# Patient Record
Sex: MALE | Race: BLACK OR AFRICAN AMERICAN
[De-identification: names, ages, dates, MRNs, and addresses within clinical notes are randomized per-mention and may not be internally consistent; named-entity substitution may affect disease eponyms.]

---

## 2018-06-15 ENCOUNTER — HOSPITAL ENCOUNTER (INPATIENT)
Dept: HOSPITAL 74 - YASAS | Age: 58
LOS: 3 days | Discharge: ANOTHER HOSPITAL | DRG: 897 | End: 2018-06-18
Attending: INTERNAL MEDICINE | Admitting: INTERNAL MEDICINE
Payer: MEDICARE

## 2018-06-15 VITALS — BODY MASS INDEX: 27.3 KG/M2

## 2018-06-15 DIAGNOSIS — I10: ICD-10-CM

## 2018-06-15 DIAGNOSIS — Z91.14: ICD-10-CM

## 2018-06-15 DIAGNOSIS — E78.00: ICD-10-CM

## 2018-06-15 DIAGNOSIS — K21.9: ICD-10-CM

## 2018-06-15 DIAGNOSIS — F14.20: ICD-10-CM

## 2018-06-15 DIAGNOSIS — F31.30: ICD-10-CM

## 2018-06-15 DIAGNOSIS — F17.210: ICD-10-CM

## 2018-06-15 DIAGNOSIS — F43.10: ICD-10-CM

## 2018-06-15 DIAGNOSIS — F10.230: Primary | ICD-10-CM

## 2018-06-15 DIAGNOSIS — G47.00: ICD-10-CM

## 2018-06-15 DIAGNOSIS — I25.10: ICD-10-CM

## 2018-06-15 DIAGNOSIS — Z91.5: ICD-10-CM

## 2018-06-15 LAB
ALBUMIN SERPL-MCNC: 3.6 G/DL (ref 3.4–5)
ALP SERPL-CCNC: 43 U/L (ref 45–117)
ALT SERPL-CCNC: 27 U/L (ref 12–78)
ANION GAP SERPL CALC-SCNC: 7 MMOL/L (ref 8–16)
APPEARANCE UR: CLEAR
AST SERPL-CCNC: 14 U/L (ref 15–37)
BILIRUB SERPL-MCNC: 0.4 MG/DL (ref 0.2–1)
BILIRUB UR STRIP.AUTO-MCNC: NEGATIVE MG/DL
BUN SERPL-MCNC: 18 MG/DL (ref 7–18)
CALCIUM SERPL-MCNC: 9.1 MG/DL (ref 8.5–10.1)
CHLORIDE SERPL-SCNC: 105 MMOL/L (ref 98–107)
CO2 SERPL-SCNC: 28 MMOL/L (ref 21–32)
COLOR UR: (no result)
CREAT SERPL-MCNC: 1.4 MG/DL (ref 0.7–1.3)
DEPRECATED RDW RBC AUTO: 14.2 % (ref 11.9–15.9)
GLUCOSE SERPL-MCNC: 102 MG/DL (ref 74–106)
HCT VFR BLD CALC: 42.4 % (ref 35.4–49)
HGB BLD-MCNC: 13.9 GM/DL (ref 11.7–16.9)
KETONES UR QL STRIP: NEGATIVE
LEUKOCYTE ESTERASE UR QL STRIP.AUTO: NEGATIVE
MCH RBC QN AUTO: 28.6 PG (ref 25.7–33.7)
MCHC RBC AUTO-ENTMCNC: 32.9 G/DL (ref 32–35.9)
MCV RBC: 86.8 FL (ref 80–96)
NITRITE UR QL STRIP: NEGATIVE
PH UR: 6 [PH] (ref 5–8)
PLATELET # BLD AUTO: 267 K/MM3 (ref 134–434)
PMV BLD: 8.6 FL (ref 7.5–11.1)
POTASSIUM SERPLBLD-SCNC: 4.3 MMOL/L (ref 3.5–5.1)
PROT SERPL-MCNC: 7 G/DL (ref 6.4–8.2)
PROT UR QL STRIP: NEGATIVE
PROT UR QL STRIP: NEGATIVE
RBC # BLD AUTO: 4.88 M/MM3 (ref 4–5.6)
SODIUM SERPL-SCNC: 140 MMOL/L (ref 136–145)
SP GR UR: 1.02 (ref 1–1.03)
UROBILINOGEN UR STRIP-MCNC: NEGATIVE MG/DL (ref 0.2–1)
WBC # BLD AUTO: 6.7 K/MM3 (ref 4–10)

## 2018-06-15 PROCEDURE — HZ2ZZZZ DETOXIFICATION SERVICES FOR SUBSTANCE ABUSE TREATMENT: ICD-10-PCS | Performed by: INTERNAL MEDICINE

## 2018-06-15 RX ADMIN — ZOLPIDEM TARTRATE SCH MG: 10 TABLET, FILM COATED ORAL at 22:08

## 2018-06-15 RX ADMIN — Medication SCH MG: at 22:08

## 2018-06-15 RX ADMIN — ATORVASTATIN CALCIUM SCH MG: 20 TABLET, FILM COATED ORAL at 22:08

## 2018-06-15 RX ADMIN — QUETIAPINE FUMARATE SCH MG: 100 TABLET ORAL at 22:08
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
## 2018-06-16 RX ADMIN — Medication SCH TAB: at 10:45

## 2018-06-16 RX ADMIN — QUETIAPINE FUMARATE SCH MG: 100 TABLET ORAL at 22:18

## 2018-06-16 RX ADMIN — ZOLPIDEM TARTRATE SCH MG: 10 TABLET, FILM COATED ORAL at 22:18

## 2018-06-16 RX ADMIN — ASPIRIN SCH MG: 81 TABLET, COATED ORAL at 10:45

## 2018-06-16 RX ADMIN — Medication SCH MG: at 22:18

## 2018-06-16 RX ADMIN — ATORVASTATIN CALCIUM SCH MG: 20 TABLET, FILM COATED ORAL at 22:18

## 2018-06-16 RX ADMIN — PANTOPRAZOLE SODIUM SCH MG: 40 TABLET, DELAYED RELEASE ORAL at 10:46

## 2018-06-16 RX ADMIN — NICOTINE SCH MG: 21 PATCH TRANSDERMAL at 10:46

## 2018-06-16 RX ADMIN — LISINOPRIL SCH MG: 20 TABLET ORAL at 10:45

## 2018-06-16 NOTE — EKG
Test Reason : 

Blood Pressure : ***/*** mmHG

Vent. Rate : 064 BPM     Atrial Rate : 064 BPM

   P-R Int : 130 ms          QRS Dur : 084 ms

    QT Int : 398 ms       P-R-T Axes : 066 013 027 degrees

   QTc Int : 410 ms

 

NORMAL SINUS RHYTHM

POSSIBLE LEFT ATRIAL ENLARGEMENT

LEFT VENTRICULAR HYPERTROPHY

ABNORMAL ECG

NO PREVIOUS ECGS AVAILABLE

Confirmed by [place], ZA ([id number]) on 6/16/2018 9:23:11 AM

 

Referred By:             Confirmed By:ZA RAMIRES MD

## 2018-06-16 NOTE — PN
S CIWA





- CIWA Score


Nausea/Vomiting: 3


Muscle Tremors: 3


Anxiety: 4-Mod. Anxious/Guarded


Agitation: 2


Paroxysmal Sweats: 3


Orientation: 0-Oriented


Tacttile Disturbances: 1-Very Mild Itch/Numbness


Auditory Disturbances: 0-None


Visual Disturbances: 0-None


Headache: 2-Mild


CIWA-Ar Total Score: 18





[facility] Progress Note (SOAP)


Subjective: 





Sweating, Body Aches, Interrupted Sleep, H/A, Diarrhea, Nausea.


Objective: 


PATIENT A & O X 3, OBSERVED AMBULATING ON UNIT. NO ACUTE DISTRESS.





06/16/18 16:21


 Vital Signs











Temperature  96.9 F L  06/16/18 13:33


 


Pulse Rate  73   06/16/18 13:33


 


Respiratory Rate  18   06/16/18 13:33


 


Blood Pressure  128/80   06/16/18 13:33


 


O2 Sat by Pulse Oximetry (%)      








 Laboratory Tests











  06/15/18 06/15/18 06/15/18





  11:00 11:00 11:00


 


WBC    6.7


 


RBC    4.88


 


Hgb    13.9


 


Hct    42.4


 


MCV    86.8


 


MCH    28.6


 


MCHC    32.9


 


RDW    14.2


 


Plt Count    267


 


MPV    8.6


 


Sodium   


 


Potassium   


 


Chloride   


 


Carbon Dioxide   


 


Anion Gap   


 


BUN   


 


Creatinine   


 


Creat Clearance w eGFR   


 


Random Glucose   


 


Calcium   


 


Total Bilirubin   


 


AST   


 


ALT   


 


Alkaline Phosphatase   


 


Total Protein   


 


Albumin   


 


Urine Color   


 


Urine Appearance   


 


Urine pH   


 


Ur Specific Gravity   


 


Urine Protein   


 


Urine Glucose (UA)   


 


Urine Ketones   


 


Urine Blood   


 


Urine Nitrite   


 


Urine Bilirubin   


 


Urine Urobilinogen   


 


Ur Leukocyte Esterase   


 


RPR Titer   


 


Hep C Ab Diagnostic   <0.1 


 


Liver Fibrosis Interp    


 


HIV 1&2 Antibody Screen  Negative  


 


HIV P24 Antigen  Negative  














  06/15/18 06/15/18 06/15/18





  11:00 11:00 12:10


 


WBC   


 


RBC   


 


Hgb   


 


Hct   


 


MCV   


 


MCH   


 


MCHC   


 


RDW   


 


Plt Count   


 


MPV   


 


Sodium  140  


 


Potassium  4.3  


 


Chloride  105  


 


Carbon Dioxide  28  


 


Anion Gap  7 L  


 


BUN  18  D  


 


Creatinine  1.4 H D  


 


Creat Clearance w eGFR  52.42  


 


Random Glucose  102  D  


 


Calcium  9.1  


 


Total Bilirubin  0.4  


 


AST  14 L D  


 


ALT  27  D  


 


Alkaline Phosphatase  43 L D  


 


Total Protein  7.0  


 


Albumin  3.6  


 


Urine Color    Ltyellow


 


Urine Appearance    Clear


 


Urine pH    6.0


 


Ur Specific Gravity    1.018


 


Urine Protein    Negative


 


Urine Glucose (UA)    Negative


 


Urine Ketones    Negative


 


Urine Blood    Negative


 


Urine Nitrite    Negative


 


Urine Bilirubin    Negative


 


Urine Urobilinogen    Negative


 


Ur Leukocyte Esterase    Negative


 


RPR Titer   Nonreactive 


 


Hep C Ab Diagnostic   


 


Liver Fibrosis Interp   


 


HIV 1&2 Antibody Screen   


 


HIV P24 Antigen   








LABS NOTED.


Assessment: 





06/16/18 16:21


WITHDRAWAL SYMPTOMS.


Plan: 





CONTINUE DETOX.


INCREASE DAILY PO FLUID INTAKE.


PRN IMMODIUM FOR DIARRHEA.


D/C IBUPROFEN AND MAGNESIUM-CONTAINING MEDS. FOR ABNORMAL ADMISSION RENAL LAB 

VALUES.

## 2018-06-17 RX ADMIN — Medication SCH MG: at 22:11

## 2018-06-17 RX ADMIN — ZOLPIDEM TARTRATE SCH MG: 10 TABLET, FILM COATED ORAL at 22:12

## 2018-06-17 RX ADMIN — ATORVASTATIN CALCIUM SCH MG: 20 TABLET, FILM COATED ORAL at 22:12

## 2018-06-17 RX ADMIN — NICOTINE SCH MG: 21 PATCH TRANSDERMAL at 10:26

## 2018-06-17 RX ADMIN — ALUMINUM HYDROXIDE, MAGNESIUM HYDROXIDE, AND SIMETHICONE PRN ML: 200; 200; 20 SUSPENSION ORAL at 20:24

## 2018-06-17 RX ADMIN — ASPIRIN SCH MG: 81 TABLET, COATED ORAL at 10:26

## 2018-06-17 RX ADMIN — LISINOPRIL SCH MG: 20 TABLET ORAL at 10:26

## 2018-06-17 RX ADMIN — Medication SCH TAB: at 10:25

## 2018-06-17 RX ADMIN — PANTOPRAZOLE SODIUM SCH MG: 40 TABLET, DELAYED RELEASE ORAL at 10:26

## 2018-06-17 RX ADMIN — QUETIAPINE FUMARATE SCH MG: 100 TABLET ORAL at 22:12

## 2018-06-17 NOTE — PN
[facility] Progress Note


Note: 





Pt's Bp slightly elevated, pt with no complaints.


No s/s noted


ongoing monitoring of Bp

## 2018-06-17 NOTE — PN
[facility] CIWA





- CIWA Score


Nausea/Vomitin-Mild Nausea/No Vomiting


Muscle Tremors: 2


Anxiety: 3


Agitation: 3


Paroxysmal Sweats: 2


Orientation: 0-Oriented


Tacttile Disturbances: 0-None


Auditory Disturbances: 0-None


Visual Disturbances: 1-Very Mild Sensitivity


Headache: 0-None Present


CIWA-Ar Total Score: 12





[facility] Progress Note (SOAP)


Subjective: 





body aches, interrupted sleep, anxious, diarrhea 


Objective: 





18 11:44


 Vital Signs











Temperature  96.6 F L  18 09:27


 


Pulse Rate  16 L  18 09:27


 


Respiratory Rate  115 H  18 09:27


 


Blood Pressure  130/87   18 09:27


 


O2 Sat by Pulse Oximetry (%)      








 Laboratory Last Values











WBC  6.7 K/mm3 (4.0-10.0)   06/15/18  11:00    


 


RBC  4.88 M/mm3 (4.00-5.60)   06/15/18  11:00    


 


Hgb  13.9 GM/dL (11.7-16.9)   06/15/18  11:00    


 


Hct  42.4 % (35.4-49)   06/15/18  11:00    


 


MCV  86.8 fl (80-96)   06/15/18  11:00    


 


MCH  28.6 pg (25.7-33.7)   06/15/18  11:00    


 


MCHC  32.9 g/dl (32.0-35.9)   06/15/18  11:00    


 


RDW  14.2 % (11.9-15.9)   06/15/18  11:00    


 


Plt Count  267 K/MM3 (134-434)   06/15/18  11:00    


 


MPV  8.6 fl (7.5-11.1)   06/15/18  11:00    


 


Sodium  140 mmol/L (136-145)   06/15/18  11:00    


 


Potassium  4.3 mmol/L (3.5-5.1)   06/15/18  11:00    


 


Chloride  105 mmol/L ()   06/15/18  11:00    


 


Carbon Dioxide  28 mmol/L (21-32)   06/15/18  11:00    


 


Anion Gap  7  (8-16)  L  06/15/18  11:00    


 


BUN  18 mg/dL (7-18)  D 06/15/18  11:00    


 


Creatinine  1.4 mg/dL (0.7-1.3)  H D 06/15/18  11:00    


 


Creat Clearance w eGFR  52.42  (>60)   06/15/18  11:00    


 


Random Glucose  102 mg/dL ()  D 06/15/18  11:00    


 


Calcium  9.1 mg/dL (8.5-10.1)   06/15/18  11:00    


 


Total Bilirubin  0.4 mg/dL (0.2-1.0)   06/15/18  11:00    


 


AST  14 U/L (15-37)  L D 06/15/18  11:00    


 


ALT  27 U/L (12-78)  D 06/15/18  11:00    


 


Alkaline Phosphatase  43 U/L ()  L D 06/15/18  11:00    


 


Total Protein  7.0 g/dl (6.4-8.2)   06/15/18  11:00    


 


Albumin  3.6 g/dl (3.4-5.0)   06/15/18  11:00    


 


Urine Color  Ltyellow   06/15/18  12:10    


 


Urine Appearance  Clear   06/15/18  12:10    


 


Urine pH  6.0  (5.0-8.0)   06/15/18  12:10    


 


Ur Specific Gravity  1.018  (1.001-1.035)   06/15/18  12:10    


 


Urine Protein  Negative  (NEGATIVE)   06/15/18  12:10    


 


Urine Glucose (UA)  Negative  (NEGATIVE)   06/15/18  12:10    


 


Urine Ketones  Negative  (NEGATIVE)   06/15/18  12:10    


 


Urine Blood  Negative  (NEGATIVE)   06/15/18  12:10    


 


Urine Nitrite  Negative  (NEGATIVE)   06/15/18  12:10    


 


Urine Bilirubin  Negative  (<2.0 mg/dL)   06/15/18  12:10    


 


Urine Urobilinogen  Negative mg/dL (0.2-1.0)   06/15/18  12:10    


 


Ur Leukocyte Esterase  Negative  (NEGATIVE)   06/15/18  12:10    


 


RPR Titer  Nonreactive  (NONREACTIVE)   06/15/18  11:00    


 


Hep C Ab Diagnostic  <0.1 s/co ratio (0.0-0.9)   06/15/18  11:00    


 


Liver Fibrosis Interp    (.)   06/15/18  11:00    


 


HIV 1&2 Antibody Screen  Negative   06/15/18  11:00    


 


HIV P24 Antigen  Negative   06/15/18  11:00    











Assessment: 





18 11:44


AOx3


no adventurous breath sounds or respiratory distress 


full ROM 


+anxious 








withdrawal sx 


Plan: 





increase fluids 


immodium PRN for diarrhea 


continue detox

## 2018-06-18 ENCOUNTER — HOSPITAL ENCOUNTER (OUTPATIENT)
Dept: HOSPITAL 74 - JER | Age: 58
Setting detail: OBSERVATION
LOS: 2 days | Discharge: HOME | DRG: 313 | End: 2018-06-20
Attending: INTERNAL MEDICINE | Admitting: INTERNAL MEDICINE
Payer: MEDICARE

## 2018-06-18 VITALS — HEART RATE: 90 BPM | DIASTOLIC BLOOD PRESSURE: 86 MMHG | TEMPERATURE: 96.6 F | SYSTOLIC BLOOD PRESSURE: 144 MMHG

## 2018-06-18 VITALS — BODY MASS INDEX: 28.4 KG/M2

## 2018-06-18 DIAGNOSIS — E78.00: ICD-10-CM

## 2018-06-18 DIAGNOSIS — Z79.82: ICD-10-CM

## 2018-06-18 DIAGNOSIS — E11.9: ICD-10-CM

## 2018-06-18 DIAGNOSIS — K21.9: ICD-10-CM

## 2018-06-18 DIAGNOSIS — F10.230: ICD-10-CM

## 2018-06-18 DIAGNOSIS — F43.10: ICD-10-CM

## 2018-06-18 DIAGNOSIS — R07.89: Primary | ICD-10-CM

## 2018-06-18 DIAGNOSIS — I10: ICD-10-CM

## 2018-06-18 DIAGNOSIS — J45.909: ICD-10-CM

## 2018-06-18 DIAGNOSIS — F17.210: ICD-10-CM

## 2018-06-18 DIAGNOSIS — E78.5: ICD-10-CM

## 2018-06-18 DIAGNOSIS — F31.9: ICD-10-CM

## 2018-06-18 DIAGNOSIS — I25.10: ICD-10-CM

## 2018-06-18 DIAGNOSIS — F14.20: ICD-10-CM

## 2018-06-18 LAB
ALBUMIN SERPL-MCNC: 3.2 G/DL (ref 3.4–5)
ALP SERPL-CCNC: 95 U/L (ref 45–117)
ALT SERPL-CCNC: 39 U/L (ref 12–78)
ANION GAP SERPL CALC-SCNC: 4 MMOL/L (ref 8–16)
AST SERPL-CCNC: 19 U/L (ref 15–37)
BASOPHILS # BLD: 1.1 % (ref 0–2)
BILIRUB SERPL-MCNC: 0.2 MG/DL (ref 0.2–1)
BUN SERPL-MCNC: 15 MG/DL (ref 7–18)
CALCIUM SERPL-MCNC: 8.9 MG/DL (ref 8.5–10.1)
CHLORIDE SERPL-SCNC: 107 MMOL/L (ref 98–107)
CO2 SERPL-SCNC: 30 MMOL/L (ref 21–32)
CREAT SERPL-MCNC: 0.9 MG/DL (ref 0.7–1.3)
DEPRECATED RDW RBC AUTO: 14.3 % (ref 11.9–15.9)
EOSINOPHIL # BLD: 3.8 % (ref 0–4.5)
GLUCOSE SERPL-MCNC: 108 MG/DL (ref 74–106)
HCT VFR BLD CALC: 39.6 % (ref 35.4–49)
HGB BLD-MCNC: 12.7 GM/DL (ref 11.7–16.9)
LYMPHOCYTES # BLD: 33.4 % (ref 8–40)
MCH RBC QN AUTO: 27.8 PG (ref 25.7–33.7)
MCHC RBC AUTO-ENTMCNC: 32.2 G/DL (ref 32–35.9)
MCV RBC: 86.5 FL (ref 80–96)
MONOCYTES # BLD AUTO: 9.9 % (ref 3.8–10.2)
NEUTROPHILS # BLD: 51.8 % (ref 42.8–82.8)
PLATELET # BLD AUTO: 246 K/MM3 (ref 134–434)
PMV BLD: 8.7 FL (ref 7.5–11.1)
POTASSIUM SERPLBLD-SCNC: 4.4 MMOL/L (ref 3.5–5.1)
PROT SERPL-MCNC: 6.5 G/DL (ref 6.4–8.2)
RBC # BLD AUTO: 4.57 M/MM3 (ref 4–5.6)
SODIUM SERPL-SCNC: 141 MMOL/L (ref 136–145)
WBC # BLD AUTO: 6.2 K/MM3 (ref 4–10)

## 2018-06-18 PROCEDURE — G0378 HOSPITAL OBSERVATION PER HR: HCPCS

## 2018-06-18 PROCEDURE — 3E0337Z INTRODUCTION OF ELECTROLYTIC AND WATER BALANCE SUBSTANCE INTO PERIPHERAL VEIN, PERCUTANEOUS APPROACH: ICD-10-PCS | Performed by: INTERNAL MEDICINE

## 2018-06-18 RX ADMIN — Medication SCH TAB: at 10:10

## 2018-06-18 RX ADMIN — Medication SCH MG: at 22:53

## 2018-06-18 RX ADMIN — ASPIRIN SCH MG: 81 TABLET, COATED ORAL at 10:10

## 2018-06-18 RX ADMIN — ATORVASTATIN CALCIUM SCH MG: 20 TABLET, FILM COATED ORAL at 22:53

## 2018-06-18 RX ADMIN — NICOTINE SCH MG: 21 PATCH TRANSDERMAL at 10:10

## 2018-06-18 RX ADMIN — ALUMINUM HYDROXIDE, MAGNESIUM HYDROXIDE, AND SIMETHICONE PRN ML: 200; 200; 20 SUSPENSION ORAL at 13:34

## 2018-06-18 RX ADMIN — ALUMINUM HYDROXIDE, MAGNESIUM HYDROXIDE, AND SIMETHICONE PRN ML: 200; 200; 20 SUSPENSION ORAL at 16:16

## 2018-06-18 RX ADMIN — GABAPENTIN SCH MG: 300 CAPSULE ORAL at 22:53

## 2018-06-18 RX ADMIN — LISINOPRIL SCH MG: 20 TABLET ORAL at 10:10

## 2018-06-18 RX ADMIN — PANTOPRAZOLE SODIUM SCH MG: 40 TABLET, DELAYED RELEASE ORAL at 10:10

## 2018-06-18 NOTE — PN
[facility] Progress Note


Note: 





Called to see patient for c/o chest pain. Patient states has had chest pain for 

last 3 days and thought it was heart burn. No relief w/ treatment for GERD.  

Pain is mid-sternal and associated w/ SOB. Hx. asthma. On final day for alcohol 

detox. 


A: Murmur noted on PE. HR regular rhythm. Repeat EKG shows T-wave abnormalities 

noted on EKG. No edema. Lungs CTA. Distended/protuberant abdomen, non-tender w/ 

BS wnl. 


 Vital Signs (72 hours)











  06/15/18 06/15/18 06/16/18





  18:15 22:18 00:23


 


Temperature 97.1 F L 96.4 F L 


 


Pulse Rate 62 65 


 


Respiratory 18 18 18





Rate   


 


Blood Pressure 135/77 126/76 














  06/16/18 06/16/18 06/16/18





  06:30 09:16 13:33


 


Temperature 97.0 F L 97.0 F L 96.9 F L


 


Pulse Rate 71 66 73


 


Respiratory 18 18 18





Rate   


 


Blood Pressure 122/76 125/72 128/80














  06/16/18 06/16/18 06/17/18





  17:36 21:24 00:30


 


Temperature 97.7 F 97.0 F L 


 


Pulse Rate 64 73 


 


Respiratory 18 18 18





Rate   


 


Blood Pressure 113/63 127/77 














  06/17/18 06/17/18 06/17/18





  03:30 06:26 09:27


 


Temperature  96.6 F L 96.6 F L


 


Pulse Rate  90 16 L


 


Respiratory 18 18 115 H





Rate   


 


Blood Pressure  130/87 130/87














  06/17/18 06/17/18 06/17/18





  14:00 17:13 21:11


 


Temperature 96.7 F L 98.6 F 98.3 F


 


Pulse Rate 78 86 90


 


Respiratory 18 18 18





Rate   


 


Blood Pressure 109/62 159/95 124/76














  06/18/18 06/18/18 06/18/18





  00:30 03:30 03:33


 


Temperature   


 


Pulse Rate   


 


Respiratory 18 18 18





Rate   


 


Blood Pressure   














  06/18/18 06/18/18 06/18/18





  06:07 09:05 13:42


 


Temperature 96.8 F L 97.1 F L 97.2 F L


 


Pulse Rate 87 83 96 H


 


Respiratory 18 20 20





Rate   


 


Blood Pressure 130/73 120/75 143/92








 P: Transport to [facility]. Report given to Mervin Crockett MD.

## 2018-06-18 NOTE — PDOC
Attending Attestation





- HPI


HPI: 








The patient is a 58 year old male past medical history of NIDDM, HLD, HTN, 

Asthma, GERD, ACS, PTSD, and Bipolar disorder presents to the emergency 

department from [facility] complaining of chest pain. The patient reports 

intermittent chest pain for the past 2 days, associated with episodes of 

diarrhea, vomiting and chills. The patient reports the pain was worse today 

leading to the ED visit, the patient states around 7-8 AM in the morning the 

patients been experiencing on and off chest pain. The patient states 

currently the pain is 4/10 in severity. The patient reports 2 prior heart 

attacks, one cocaine induced, the patient was supposed to cardiac cath but 

states he didnt follow up. The patient was admitted to [facility] on Friday 06/

15/2018) for alcohol detox. The patient states he was taken of metformin 18 

months ago, with unknown reasons. 





Denies fever, chills, cough or headache. Denies sob or orthopnea. Denies 

constipation. Denies dysuria, hematuria, frequency or urgency to urinate. 

Denies vertigo or dizziness. 





Allergies: NKDA, McKean. 


Social history: The patient reports he smokes about 5 cigarettes daily. Reports 

the daily use of alcohol since teenager, states he drinks about 6 24 OZ and 1-

2 pint a day. The patient reports the use of heroine, denies recent use. 


Surgical history: appendectomy (.87)


PCP: None reported. 








- Physicial Exam


PE: 








06/18/18 18:57


GENERAL:


Well developed, well nourished. Awake and alert. No acute distress.


HEENT:


Normocephalic, atraumatic. PERRLA, EOMI. No conjunctival pallor. Sclera are non-

icteric. Moist mucous membranes. Oropharynx is clear.


NECK: 


Supple. Full ROM. No JVD. Carotid pulses 2+ and symmetric, without bruits. No 

thyromegaly. No lymphadenopathy.


CARDIOVASCULAR: (+) holosystolic murmur


Regular rate and rhythm. No rubs, or gallops. Distal pulses are 2+ and 

symmetric. 


PULMONARY: 


No evidence of respiratory distress. Lungs clear to auscultation bilaterally. 

No wheezing, rales or rhonchi.


ABDOMINAL:


Soft. Non-tender. Non-distended. No rebound or guarding. No organomegaly. 

Normoactive bowel sounds. 


MUSCULOSKELETAL 


Normal range of motion at all joints. No bony deformities or tenderness. No CVA 

tenderness.


EXTREMITIES: 


No cyanosis. No clubbing. No edema. No calf tenderness.


SKIN: 


Warm and dry. Normal capillary refill. No rashes. No jaundice. 


NEUROLOGICAL: 


Alert, awake, appropriate. Cranial nerves 2-12 intact. No deficits to light 

touch and temperature in face, upper extremities and lower extremities. No 

motor deficits in the in face, upper extremities and lower extremities. 

Normoreflexic in the upper and lower extremities. Normal speech. Toes are down-

going bilaterally. Gait is normal without ataxia.


PSYCHIATRIC: 


Cooperative. Good eye contact. Appropriate mood and affect.











- Medical Decision Making








06/18/18 18:59





Documentation prepared by Tianna Pierre, acting as medical scribe for Elba Crain MD. 





<Tianna Pierre - Last Filed: 06/18/18 18:55>





- Resident


Resident Name: Russ Rodriguezorn





- ED Attending Attestation


I have performed the following: I have examined & evaluated the patient, The 

case was reviewed & discussed with the resident, I agree w/resident's findings 

& plan, Exceptions are as noted





- HPI


HPI: 





06/18/18 17:51


58-year-old male brought in by ambulance from [facility] for complaints of chest 

pain that is intermittent for 2 days





- Physicial Exam


PE: 





06/18/18 17:52


58-year-old male is conversant and alert.  He is chest pain is 4 out of 10


Head normocephalic/atraumatic.


Neck no JVD.


CVS regular rate and rhythm S1, S2.  Positive holosystolic murmur.


Abdomen is soft


Extremities no edema, no cellulitis.


Skin is warm and dry.


Neuro alert and oriented 3, moving extremities purposefully. 


 Psych patient is appropriate





- Medical Decision Making





06/18/18 17:53


Impression angina


Plan EKG, troponin, CBC, chemistry, INR, trop x 3, admit tele OBS


06/18/18 18:31





06/18/18 19:36, Dr Tejada accepted the case


first trop is negative





<Elba Crain - Last Filed: 06/18/18 19:37>

## 2018-06-18 NOTE — HP
Admitting History and Physical





- Primary Care Physician


PCP: Niki Tejada





- Admission


Chief Complaint: chest pain


History of Present Illness: 





Mr. Eldridge is a 57 yo male w/ pmh of Asthma, NIDDM, HLD, HTN, GERD, ACS, 

appendectomy, PTSD, and Bipolar Disorder who presents for evaluation of 2-3 day 

history of intermittent chest pain. He reports he has had 2 MI's in the past (

one cocaine induced, one he was told to get a cardiac cath after and never 

followed up) and decided to come in from [facility] for evaluation after his 

chest pain started. He smokes 5 cigarettes per day. Sees NP Jade Kaur at 

[facility], currently on Seroquel 250 mg po hs, Risperdol 2 mg am and 3 

mg hs, Trazodone 100 mg po hs, Gabapentin 300 mg po bid.











- Past Medical History


Cardiovascular: Yes: CAD, HTN, Hyperlipdemia


Pulmonary: Yes: Asthma


Endocrine: Yes: Diabetes Mellitus





- Smoking History


Smoking history: Current every day smoker


Have you smoked in the past 12 months: Yes


Aproximately how many cigarettes per day: 5





- Alcohol/Substance Use


Hx Alcohol Use: Yes





Home Medications





- Allergies


Allergies/Adverse Reactions: 


 Allergies











Allergy/AdvReac Type Severity Reaction Status Date / Time


 


No Known Drug Allergies Allergy   Verified 06/18/18 17:43


 


strawberry [Strawberry] Allergy  Itching Verified 06/18/18 17:43














- Home Medications


Home Medications: 


Ambulatory Orders





Simvastatin [Zocor -] 40 mg PO HS #30 tablet 04/04/14 


Quetiapine Fumarate [Seroquel -] 50 mg PO DAILY 02/12/15 


Risperidone [Risperdal -] 6 mg PO BID 02/12/15 


Albuterol Sulfate Inhaler - [Ventolin HFA Inhaler -] 2 inh IH Q4H PRN #1 

canister 02/16/15 


Thiamine HCl [Vitamin B1 -] 100 mg PO HS #30 tablet 02/16/15 


Aspirin [Ecotrin] 81 mg PO DAILY 04/08/16 


Lisinopril 20 mg PO DAILY 04/08/16 


Naproxen 500 mg PO DAILY 04/08/16 


Omeprazole [Prilosec] 40 mg PO DAILY 04/08/16 


Zolpidem Tartrate 10 mg PO HS 04/08/16 


Fluticasone/Salmeterol [Advair 500-50 Diskus] 1 puff IH BID 09/14/16 


Paroxetine HCl [Paxil -] 40 mg PO DAILY 09/14/16 


Folic Acid - 1 mg PO DAILY 06/15/18 


Gabapentin [Neurontin -] 300 mg PO TID 06/15/18 


Quetiapine Fumarate [Seroquel -] 200 mg PO HS 06/15/18 


Trazodone HCl 100 mg PO HS 06/15/18 











Family Disease History





- Family Disease History


Family Disease History: Heart Disease: Grandparent (HTN), Father (alcohol, 

deceaded heart attack), Mother (HTN,ALCOHOLIC), Sister (hypertension/

hypercholesterol), Other: Father, Mother, Sister





Physical Examination


Vital Signs: 


 Vital Signs











Temperature  98.4 F   06/18/18 17:20


 


Pulse Rate  79   06/18/18 17:20


 


Respiratory Rate  18   06/18/18 17:20


 


Blood Pressure  122/77   06/18/18 17:20


 


O2 Sat by Pulse Oximetry (%)  98   06/18/18 18:07











Constitutional: Yes: No Distress


HENT: Yes: Atraumatic


Neck: Yes: Supple


Cardiovascular: Yes: Regular Rate and Rhythm


Respiratory: Yes: CTA Bilaterally


Gastrointestinal: Yes: Normal Bowel Sounds


Extremities: Yes: WNL


Labs: 


 CBC, BMP





 06/18/18 17:49 





 06/18/18 17:49 











Problem List





- Problems


(1) Alcohol dependence with uncomplicated withdrawal


Code(s): F10.230 - ALCOHOL DEPENDENCE WITH WITHDRAWAL, UNCOMPLICATED   





(2) Chest pain


Assessment/Plan: 


tele monitoring


fu cardiac profile


echo


cardiology consult


oxygen inhalation


Code(s): R07.9 - CHEST PAIN, UNSPECIFIED   


Qualifiers: 


   Chest pain type: unspecified   Qualified Code(s): R07.9 - Chest pain, 

unspecified   





(3) Cocaine dependence


Code(s): F14.20 - COCAINE DEPENDENCE, UNCOMPLICATED   


Qualifiers: 


 





(4) HTN (hypertension)


Assessment/Plan: 


on meds


monitor


Code(s): I10 - ESSENTIAL (PRIMARY) HYPERTENSION   





(5) Hypercholesterolemia


Assessment/Plan: 


on meds


stable


Code(s): E78.0 - PURE HYPERCHOLESTEROLEMIA * DO NOT USE *   





Assessment/Plan





 Laboratory Tests











  06/18/18 06/18/18





  17:49 17:49


 


WBC  6.2 


 


RBC  4.57 


 


Hgb  12.7 


 


Hct  39.6 


 


MCV  86.5 


 


MCH  27.8 


 


MCHC  32.2 


 


RDW  14.3 


 


Plt Count  246 


 


MPV  8.7 


 


Absolute Neuts (auto)  3.2 


 


Neutrophils %  51.8 


 


Lymphocytes %  33.4 


 


Monocytes %  9.9 


 


Eosinophils %  3.8 


 


Basophils %  1.1 


 


Nucleated RBC %  0 


 


Sodium   141


 


Potassium   4.4


 


Chloride   107


 


Carbon Dioxide   30


 


Anion Gap   4 L


 


BUN   15


 


Creatinine   0.9  D


 


Creat Clearance w eGFR   > 60


 


Random Glucose   108 H


 


Calcium   8.9


 


Total Bilirubin   0.2  D


 


AST   19  D


 


ALT   39  D


 


Alkaline Phosphatase   95  D


 


Creatine Kinase   198


 


Troponin I   < 0.02


 


Total Protein   6.5


 


Albumin   3.2 L








Active Medications











Generic Name Dose Route Start Last Admin





  Trade Name Freq  PRN Reason Stop Dose Admin


 


Albuterol Sulfate  2 puff  06/18/18 20:53  





  Ventolin Hfa Inhaler -  IH   





  Q4H PRN   





  SHORT OF BREATH/WHEEZING   





     





     





     


 


Aspirin  81 mg  06/19/18 10:00  06/19/18 09:52





  Ecotrin -  PO   81 mg





  DAILY JESSICA   Administration





     





     





     





     


 


Atorvastatin Calcium  20 mg  06/18/18 22:00  06/18/18 22:53





  Lipitor -  PO   20 mg





  HS JESSICA   Administration





     





     





     





     


 


Folic Acid  1 mg  06/19/18 10:00  06/19/18 09:51





  Folic Acid -  PO   1 mg





  DAILY JESSICA   Administration





     





     





     





     


 


Gabapentin  300 mg  06/18/18 22:00  06/19/18 15:14





  Neurontin -  PO   300 mg





  TID JESSICA   Administration





     





     





     





     


 


Lisinopril  20 mg  06/19/18 10:00  06/19/18 09:52





  Prinivil  PO   20 mg





  DAILY JESSICA   Administration





     





     





     





     


 


Paroxetine HCl  40 mg  06/19/18 10:00  06/19/18 09:51





  Paxil -  PO   40 mg





  DAILY JESSICA   Administration





     





     





     





     


 


Quetiapine Fumarate  50 mg  06/19/18 10:00  06/19/18 09:52





  Seroquel -  PO   50 mg





  DAILY JESSICA   Administration





     





     





     





     


 


Thiamine HCl  100 mg  06/18/18 22:00  06/18/18 22:53





  Vitamin B1 -  PO   100 mg





  HS JESSICA   Administration

## 2018-06-18 NOTE — PN
[facility] Progress Note (SOAP)


Subjective: 





Sweats


Shakes


sleep disturbance


Heartburn


Objective: 





06/18/18 12:30


Lying in bed, no acute distress


A & O x 3


 Vital Signs











Temperature  97.1 F L  06/18/18 09:05


 


Pulse Rate  83   06/18/18 09:05


 


Respiratory Rate  20   06/18/18 09:05


 


Blood Pressure  120/75   06/18/18 09:05


 


O2 Sat by Pulse Oximetry (%)      











Assessment: 





06/18/18 12:30


withdrawal sx


Acid reflux


Plan: 





Continue detox


Continue increased hydratio


Protonix for acid reflux


increased hydration


Avoid caffeinated beverages

## 2018-06-19 VITALS — TEMPERATURE: 98 F

## 2018-06-19 RX ADMIN — GABAPENTIN SCH MG: 300 CAPSULE ORAL at 06:34

## 2018-06-19 RX ADMIN — GABAPENTIN SCH MG: 300 CAPSULE ORAL at 15:14

## 2018-06-19 NOTE — PN
Progress Note, Physician





- Current Medication List


Current Medications: 


Active Medications





Albuterol Sulfate (Ventolin Hfa Inhaler -)  2 puff IH Q4H PRN


   PRN Reason: SHORT OF BREATH/WHEEZING


Aspirin (Ecotrin -)  81 mg PO DAILY [facility]


   Last Admin: 06/19/18 09:52 Dose:  81 mg


Atorvastatin Calcium (Lipitor -)  20 mg PO [facility]


   Last Admin: 06/18/18 22:53 Dose:  20 mg


Folic Acid (Folic Acid -)  1 mg PO DAILY [facility]


   Last Admin: 06/19/18 09:51 Dose:  1 mg


Gabapentin (Neurontin -)  300 mg PO TID [facility]


   Last Admin: 06/19/18 15:14 Dose:  300 mg


Lisinopril (Prinivil)  20 mg PO DAILY [facility]


   Last Admin: 06/19/18 09:52 Dose:  20 mg


Paroxetine HCl (Paxil -)  40 mg PO DAILY [facility]


   Last Admin: 06/19/18 09:51 Dose:  40 mg


Quetiapine Fumarate (Seroquel -)  50 mg PO DAILY [facility]


   Last Admin: 06/19/18 09:52 Dose:  50 mg


Thiamine HCl (Vitamin B1 -)  100 mg PO [facility]


   Last Admin: 06/18/18 22:53 Dose:  100 mg











- Objective


Vital Signs: 


 Vital Signs











Temperature  98 F   06/19/18 15:20


 


Pulse Rate  68   06/19/18 15:20


 


Respiratory Rate  20   06/19/18 15:20


 


Blood Pressure  90/67   06/19/18 15:20


 


O2 Sat by Pulse Oximetry (%)  100   06/19/18 10:00











Constitutional: Yes: No Distress


HENT: Yes: Atraumatic


Neck: Yes: Supple


Cardiovascular: Yes: Regular Rate and Rhythm


Respiratory: Yes: CTA Bilaterally


Gastrointestinal: Yes: Normal Bowel Sounds


Extremities: Yes: WNL


Neurological: Yes: Alert, Oriented


Labs: 


 CBC, BMP





 06/18/18 17:49 





 06/18/18 17:49 











Problem List





- Problems


(1) Alcohol dependence with uncomplicated withdrawal


Assessment/Plan: 


done with [facility] , will be going to other rehab on his own


pt  has info


Code(s): F10.230 - ALCOHOL DEPENDENCE WITH WITHDRAWAL, UNCOMPLICATED   





(2) Chest pain


Assessment/Plan: 


tele monitoring


fu cardiac profile...negative


echo...done





Code(s): R07.9 - CHEST PAIN, UNSPECIFIED   


Qualifiers: 


   Chest pain type: unspecified   Qualified Code(s): R07.9 - Chest pain, 

unspecified   





(3) Cocaine dependence


Code(s): F14.20 - COCAINE DEPENDENCE, UNCOMPLICATED   


Qualifiers: 


 





(4) HTN (hypertension)


Code(s): I10 - ESSENTIAL (PRIMARY) HYPERTENSION   


Qualifiers: 


   Hypertension type: essential hypertension   Qualified Code(s): I10 - 

Essential (primary) hypertension   





(5) Hypercholesterolemia


Code(s): E78.0 - PURE HYPERCHOLESTEROLEMIA * DO NOT USE *

## 2018-06-19 NOTE — PN
Progress Note, Physician





- Current Medication List


Current Medications: 


Active Medications





Albuterol Sulfate (Ventolin Hfa Inhaler -)  2 puff IH Q4H PRN


   PRN Reason: SHORT OF BREATH/WHEEZING


Aspirin (Ecotrin -)  81 mg PO DAILY [facility]


   Last Admin: 06/19/18 09:52 Dose:  81 mg


Atorvastatin Calcium (Lipitor -)  20 mg PO [facility]


   Last Admin: 06/18/18 22:53 Dose:  20 mg


Folic Acid (Folic Acid -)  1 mg PO DAILY [facility]


   Last Admin: 06/19/18 09:51 Dose:  1 mg


Gabapentin (Neurontin -)  300 mg PO TID [facility]


   Last Admin: 06/19/18 15:14 Dose:  300 mg


Lisinopril (Prinivil)  20 mg PO DAILY [facility]


   Last Admin: 06/19/18 09:52 Dose:  20 mg


Paroxetine HCl (Paxil -)  40 mg PO DAILY [facility]


   Last Admin: 06/19/18 09:51 Dose:  40 mg


Quetiapine Fumarate (Seroquel -)  50 mg PO DAILY [facility]


   Last Admin: 06/19/18 09:52 Dose:  50 mg


Thiamine HCl (Vitamin B1 -)  100 mg PO [facility]


   Last Admin: 06/18/18 22:53 Dose:  100 mg











- Objective


Vital Signs: 


 Vital Signs











Temperature  98 F   06/19/18 15:20


 


Pulse Rate  68   06/19/18 15:20


 


Respiratory Rate  20   06/19/18 15:20


 


Blood Pressure  90/67   06/19/18 15:20


 


O2 Sat by Pulse Oximetry (%)  100   06/19/18 10:00











Constitutional: Yes: No Distress


HENT: Yes: Atraumatic


Neck: Yes: Supple


Cardiovascular: Yes: Regular Rate and Rhythm


Respiratory: Yes: CTA Bilaterally


Gastrointestinal: Yes: Normal Bowel Sounds


Extremities: Yes: WNL


Edema: No


Peripheral Pulses WNL: Yes


Neurological: Yes: Alert, Oriented


Labs: 


 CBC, BMP





 06/18/18 17:49 





 06/18/18 17:49 











Problem List





- Problems


(1) Alcohol dependence with uncomplicated withdrawal


Assessment/Plan: 


dc to rehab


Code(s): F10.230 - ALCOHOL DEPENDENCE WITH WITHDRAWAL, UNCOMPLICATED   





(2) Chest pain


Assessment/Plan: 


tele monitoring


fu cardiac profile...negative


echo...done





Code(s): R07.9 - CHEST PAIN, UNSPECIFIED   


Qualifiers: 


   Chest pain type: unspecified   Qualified Code(s): R07.9 - Chest pain, 

unspecified   





(3) Cocaine dependence


Code(s): F14.20 - COCAINE DEPENDENCE, UNCOMPLICATED   


Qualifiers: 


 





(4) HTN (hypertension)


Code(s): I10 - ESSENTIAL (PRIMARY) HYPERTENSION   





(5) Hypercholesterolemia


Code(s): E78.0 - PURE HYPERCHOLESTEROLEMIA * DO NOT USE *

## 2018-06-19 NOTE — CON.CARD
Consult


Consult Specialty:: Cardiology


Referred by:: Dr. Tejada


Reason for Consultation:: Cardiac evaluation





- History of Present Illness


Chief Complaint: Chest pain


History of Present Illness: 





Patient is a 58 year old  male with underlying history of HTN, 

hypercholesterolemia and previous history of DM on Metformin, but now stopped 

who presents with chest discomfort. He also has history of substance abuse 

including ETOH and Cocaine use. He last used cocaine on Friday and his chest 

pain has been present for about 3 days. He was at the detox facility in [facility] where he was noticed to be having chest discomfort, therefore; was 

admitted to the ED for further evaluations. Troponin has been negative so far 

and he denies chest pain at this moment. He denies shortness of breath or 

palpitations. He denies paroxysmal nocturnal dyspnea or orthopnea. He denies 

fever or chills. He denies nausea, vomiting, diarrhea or abdominal pain. He 

denies headache or lightheadedness.





- History Source


History Provided By: Patient, Medical Record


Limitations to Obtaining History: Clinical Condition





- Past Medical History


Cardio/Vascular: Yes: HTN, Hyperlipdemia


Pulmonary: Yes: Asthma


Endocrine: Yes: Diabetes Mellitus





- Alcohol/Substance Use


Hx Alcohol Use: Yes


History of Substance Use: reports: Cocaine





- Smoking History


Smoking history: Current every day smoker


Have you smoked in the past 12 months: Yes


Aproximately how many cigarettes per day: 5





Home Medications





- Allergies


Allergies/Adverse Reactions: 


 Allergies











Allergy/AdvReac Type Severity Reaction Status Date / Time


 


No Known Drug Allergies Allergy   Verified 06/18/18 17:43


 


strawberry [Strawberry] Allergy  Itching Verified 06/18/18 17:43














- Home Medications


Home Medications: 


Ambulatory Orders





Simvastatin [Zocor -] 40 mg PO HS #30 tablet 04/04/14 


Quetiapine Fumarate [Seroquel -] 50 mg PO DAILY 02/12/15 


Risperidone [Risperdal -] 6 mg PO BID 02/12/15 


Albuterol Sulfate Inhaler - [Ventolin HFA Inhaler -] 2 inh IH Q4H PRN #1 

canister 02/16/15 


Thiamine HCl [Vitamin B1 -] 100 mg PO HS #30 tablet 02/16/15 


Aspirin [Ecotrin] 81 mg PO DAILY 04/08/16 


Lisinopril 20 mg PO DAILY 04/08/16 


Naproxen 500 mg PO DAILY 04/08/16 


Omeprazole [Prilosec] 40 mg PO DAILY 04/08/16 


Zolpidem Tartrate 10 mg PO HS 04/08/16 


Fluticasone/Salmeterol [Advair 500-50 Diskus] 1 puff IH BID 09/14/16 


Paroxetine HCl [Paxil -] 40 mg PO DAILY 09/14/16 


Folic Acid - 1 mg PO DAILY 06/15/18 


Gabapentin [Neurontin -] 300 mg PO TID 06/15/18 


Quetiapine Fumarate [Seroquel -] 200 mg PO HS 06/15/18 


Trazodone HCl 100 mg PO HS 06/15/18 











Family Disease History





- Family Disease History


Family Disease History: Heart Disease: Grandparent (HTN), Father (alcohol, 

deceaded heart attack), Mother (HTN,ALCOHOLIC), Sister (hypertension/

hypercholesterol), Other: Father, Mother, Sister





Review of Systems





- Review of Systems


Constitutional: denies: Chills, Fever


Cardiovascular: reports: Chest Pain.  denies: Palpitations, Shortness of Breath


Respiratory: denies: Cough, Hemoptysis, Orthopnea, PND, SOB, SOB on Exertion, 

Wheezing


Gastrointestinal: denies: Abdominal Pain, Constipation, Diarrhea, Melena, Nausea

, Rectal Bleeding, Vomiting


Genitourinary: denies: Dysuria, Hematuria


Musculoskeletal: denies: Back Pain, Joint Pain


Neurological: denies: Confusion, Dizziness, Headache, Seizure, Syncope, Weakness


Vital Signs: 


 Vital Signs











Temperature  98 F   06/19/18 09:50


 


Pulse Rate  64   06/19/18 09:50


 


Respiratory Rate  20   06/19/18 09:50


 


Blood Pressure  106/70   06/19/18 09:50


 


O2 Sat by Pulse Oximetry (%)  100   06/19/18 06:48











Eyes: Yes: PERRL


HENT: Yes: Atraumatic


Neck: Yes: Supple


Respiratory: Yes: Regular, CTA Bilaterally


Gastrointestinal: Yes: Normal Bowel Sounds, Soft.  No: Tenderness


Cardiovascular: Yes: Regular Rate and Rhythm


JVD: No


Carotid Bruit: No


PMI: Non-Displaced


Heart Sounds: Yes: S1, S2.  No: Gallop


Murmur: Yes: Systolic Murmur, Grade 1


Edema: No





- Other Data


Labs, Other Data: 


 CBC, BMP





 06/18/18 17:49 





 06/18/18 17:49 





 Troponin, BNP











  06/18/18 06/19/18





  17:49 06:48


 


Troponin I  < 0.02  < 0.02








  











Normal sinus rhythm, no ST-T abnormality


Echo: Report Reviewed





Imaging





- Results


Chest X-ray: Report Reviewed (Unremarkable)


EKG: Report Reviewed





Problem List





- Problems


(1) Chest pain


Code(s): R07.9 - CHEST PAIN, UNSPECIFIED   


Qualifiers: 


   Chest pain type: unspecified   Qualified Code(s): R07.9 - Chest pain, 

unspecified   





(2) Alcohol dependence with uncomplicated withdrawal


Code(s): F10.230 - ALCOHOL DEPENDENCE WITH WITHDRAWAL, UNCOMPLICATED   





(3) Cocaine dependence


Code(s): F14.20 - COCAINE DEPENDENCE, UNCOMPLICATED   


Qualifiers: 


 





(4) Bipolar disorder


Code(s): F31.9 - BIPOLAR DISORDER, UNSPECIFIED   





(5) HTN (hypertension)


Code(s): I10 - ESSENTIAL (PRIMARY) HYPERTENSION   


Qualifiers: 


   Hypertension type: essential hypertension   Qualified Code(s): I10 - 

Essential (primary) hypertension   





(6) Hypercholesterolemia


Code(s): E78.0 - PURE HYPERCHOLESTEROLEMIA * DO NOT USE *   





(7) Asthma


Code(s): J45.909 - UNSPECIFIED ASTHMA, UNCOMPLICATED   


Qualifiers: 


   Asthma severity: unspecified severity   Asthma persistence: unspecified   

Asthma complication type: uncomplicated   Qualified Code(s): J45.909 - 

Unspecified asthma, uncomplicated   





Assessment/Plan





1. Chest pain syndrome with history of Cocaine use


2. HTN


3. Hypercholesterolemia


4. Diabetes mellitus


5. Substance abuse with ETOH and Cocaine


6. Bronchial asthma


7. Bipolar disorder





PLAN:


1. Serial cardiac enzymes negative 2 sets


2. Echocardiography reviewed with normal LV systolic function, MR, TR and AR


3. Further cardiac work up can be done as outpatient


4. Detox for ETOH and Cocaine


5. Avoid beta blocker for now


6. Continue Lisinopril and Simvastatin


7. ASA





May be discharged home cardiac standpoint.


Eros Bangura MD

## 2018-06-19 NOTE — EKG
Test Reason : 

Blood Pressure : ***/*** mmHG

Vent. Rate : 074 BPM     Atrial Rate : 074 BPM

   P-R Int : 120 ms          QRS Dur : 084 ms

    QT Int : 376 ms       P-R-T Axes : 066 021 010 degrees

   QTc Int : 417 ms

 

NORMAL SINUS RHYTHM

POSSIBLE LEFT ATRIAL ENLARGEMENT

LEFT VENTRICULAR HYPERTROPHY

NONSPECIFIC T WAVE ABNORMALITY

ABNORMAL ECG

Confirmed by MD Cristiane, Geovani ([id number]) on 6/19/2018 2:19:04 PM

 

Referred By:             Confirmed By:Geovani Sauer MD

## 2018-06-20 VITALS — DIASTOLIC BLOOD PRESSURE: 70 MMHG | SYSTOLIC BLOOD PRESSURE: 108 MMHG | HEART RATE: 72 BPM

## 2018-06-20 RX ADMIN — ATORVASTATIN CALCIUM SCH MG: 20 TABLET, FILM COATED ORAL at 00:31

## 2018-06-20 RX ADMIN — Medication SCH: at 00:31

## 2018-06-20 RX ADMIN — GABAPENTIN SCH MG: 300 CAPSULE ORAL at 00:31

## 2018-06-20 RX ADMIN — GABAPENTIN SCH MG: 300 CAPSULE ORAL at 06:23

## 2018-06-20 NOTE — DS
Physical Examination


Vital Signs: 


 Vital Signs











Temperature  98 F   06/19/18 15:20


 


Pulse Rate  72   06/20/18 06:25


 


Respiratory Rate  14   06/20/18 06:25


 


Blood Pressure  108/70   06/20/18 06:25


 


O2 Sat by Pulse Oximetry (%)  98   06/20/18 06:25











Constitutional: Yes: No Distress


HENT: Yes: Atraumatic


Cardiovascular: Yes: Regular Rate and Rhythm


Respiratory: Yes: CTA Bilaterally


Gastrointestinal: Yes: Normal Bowel Sounds


Extremities: Yes: WNL


Neurological: Yes: Alert, Oriented


Labs: 


 CBC, BMP





 06/18/18 17:49 





 06/18/18 17:49 











Discharge Summary


Reason For Visit: CHEST PAIN





- Instructions


Disposition: HOME





- Home Medications


Comprehensive Discharge Medication List: 


Ambulatory Orders





Simvastatin [Zocor -] 40 mg PO HS #30 tablet 04/04/14 


Quetiapine Fumarate [Seroquel -] 50 mg PO DAILY 02/12/15 


Risperidone [Risperdal -] 6 mg PO BID 02/12/15 


Albuterol Sulfate Inhaler - [Ventolin HFA Inhaler -] 2 inh IH Q4H PRN #1 

canister 02/16/15 


Thiamine HCl [Vitamin B1 -] 100 mg PO HS #30 tablet 02/16/15 


Aspirin [Ecotrin] 81 mg PO DAILY 04/08/16 


Lisinopril 20 mg PO DAILY 04/08/16 


Naproxen 500 mg PO DAILY 04/08/16 


Omeprazole [Prilosec] 40 mg PO DAILY 04/08/16 


Zolpidem Tartrate 10 mg PO HS 04/08/16 


Fluticasone/Salmeterol [Advair 500-50 Diskus] 1 puff IH BID 09/14/16 


Paroxetine HCl [Paxil -] 40 mg PO DAILY 09/14/16 


Folic Acid - 1 mg PO DAILY 06/15/18 


Gabapentin [Neurontin -] 300 mg PO TID 06/15/18 


Quetiapine Fumarate [Seroquel -] 200 mg PO HS 06/15/18 


Trazodone HCl 100 mg PO HS 06/15/18 





[place] home

## 2018-12-18 ENCOUNTER — HOSPITAL ENCOUNTER (INPATIENT)
Dept: HOSPITAL 74 - YASAS | Age: 58
LOS: 3 days | Discharge: HOME | DRG: 897 | End: 2018-12-21
Attending: INTERNAL MEDICINE | Admitting: INTERNAL MEDICINE
Payer: COMMERCIAL

## 2018-12-18 VITALS — BODY MASS INDEX: 34.4 KG/M2

## 2018-12-18 DIAGNOSIS — J06.9: ICD-10-CM

## 2018-12-18 DIAGNOSIS — F10.230: Primary | ICD-10-CM

## 2018-12-18 DIAGNOSIS — F17.210: ICD-10-CM

## 2018-12-18 DIAGNOSIS — E78.00: ICD-10-CM

## 2018-12-18 DIAGNOSIS — F43.10: ICD-10-CM

## 2018-12-18 DIAGNOSIS — Z91.5: ICD-10-CM

## 2018-12-18 DIAGNOSIS — I10: ICD-10-CM

## 2018-12-18 DIAGNOSIS — G47.00: ICD-10-CM

## 2018-12-18 DIAGNOSIS — J45.909: ICD-10-CM

## 2018-12-18 DIAGNOSIS — I25.10: ICD-10-CM

## 2018-12-18 DIAGNOSIS — F14.20: ICD-10-CM

## 2018-12-18 DIAGNOSIS — Z91.19: ICD-10-CM

## 2018-12-18 DIAGNOSIS — E11.9: ICD-10-CM

## 2018-12-18 DIAGNOSIS — K21.9: ICD-10-CM

## 2018-12-18 DIAGNOSIS — F25.9: ICD-10-CM

## 2018-12-18 DIAGNOSIS — R00.0: ICD-10-CM

## 2018-12-18 PROCEDURE — HZ2ZZZZ DETOXIFICATION SERVICES FOR SUBSTANCE ABUSE TREATMENT: ICD-10-PCS | Performed by: INTERNAL MEDICINE

## 2018-12-18 RX ADMIN — TRAZODONE HYDROCHLORIDE SCH MG: 100 TABLET ORAL at 22:31

## 2018-12-18 RX ADMIN — Medication SCH MG: at 22:31

## 2018-12-18 RX ADMIN — BUDESONIDE AND FORMOTEROL FUMARATE DIHYDRATE SCH PUFF: 160; 4.5 AEROSOL RESPIRATORY (INHALATION) at 22:31
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
## 2018-12-19 LAB
ALBUMIN SERPL-MCNC: 3.8 G/DL (ref 3.4–5)
ALP SERPL-CCNC: 50 U/L (ref 45–117)
ALT SERPL-CCNC: 46 U/L (ref 13–61)
ANION GAP SERPL CALC-SCNC: 8 MMOL/L (ref 8–16)
AST SERPL-CCNC: 50 U/L (ref 15–37)
BILIRUB SERPL-MCNC: 0.3 MG/DL (ref 0.2–1)
BUN SERPL-MCNC: 14 MG/DL (ref 7–18)
CALCIUM SERPL-MCNC: 9 MG/DL (ref 8.5–10.1)
CHLORIDE SERPL-SCNC: 102 MMOL/L (ref 98–107)
CO2 SERPL-SCNC: 28 MMOL/L (ref 21–32)
CREAT SERPL-MCNC: 1.3 MG/DL (ref 0.55–1.3)
DEPRECATED RDW RBC AUTO: 14.4 % (ref 11.9–15.9)
GLUCOSE SERPL-MCNC: 93 MG/DL (ref 74–106)
HCT VFR BLD CALC: 42.4 % (ref 35.4–49)
HGB BLD-MCNC: 13.6 GM/DL (ref 11.7–16.9)
MCH RBC QN AUTO: 27.8 PG (ref 25.7–33.7)
MCHC RBC AUTO-ENTMCNC: 32 G/DL (ref 32–35.9)
MCV RBC: 86.8 FL (ref 80–96)
PLATELET # BLD AUTO: 258 K/MM3 (ref 134–434)
PMV BLD: 9.2 FL (ref 7.5–11.1)
POTASSIUM SERPLBLD-SCNC: 4.1 MMOL/L (ref 3.5–5.1)
PROT SERPL-MCNC: 7.5 G/DL (ref 6.4–8.2)
RBC # BLD AUTO: 4.88 M/MM3 (ref 4–5.6)
SODIUM SERPL-SCNC: 138 MMOL/L (ref 136–145)
WBC # BLD AUTO: 7.8 K/MM3 (ref 4–10)

## 2018-12-19 RX ADMIN — RANITIDINE SCH MG: 150 TABLET ORAL at 10:13

## 2018-12-19 RX ADMIN — ASPIRIN SCH MG: 81 TABLET, COATED ORAL at 10:13

## 2018-12-19 RX ADMIN — Medication SCH MG: at 22:03

## 2018-12-19 RX ADMIN — LISINOPRIL SCH MG: 10 TABLET ORAL at 10:16

## 2018-12-19 RX ADMIN — BUDESONIDE AND FORMOTEROL FUMARATE DIHYDRATE SCH PUFF: 160; 4.5 AEROSOL RESPIRATORY (INHALATION) at 22:03

## 2018-12-19 RX ADMIN — BUDESONIDE AND FORMOTEROL FUMARATE DIHYDRATE SCH PUFF: 160; 4.5 AEROSOL RESPIRATORY (INHALATION) at 10:31

## 2018-12-19 RX ADMIN — TRAZODONE HYDROCHLORIDE SCH MG: 100 TABLET ORAL at 22:03

## 2018-12-19 RX ADMIN — GUAIFENESIN AND DEXTROMETHORPHAN PRN ML: 100; 10 SYRUP ORAL at 10:33

## 2018-12-19 RX ADMIN — Medication SCH TAB: at 10:13
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
## 2018-12-19 NOTE — PN
S CIWA





- CIWA Score


Nausea/Vomitin-No Nausea/No Vomiting


Muscle Tremors: None


Anxiety: 0-No Anxiety, at Ease


Agitation: 0-Normal Activity


Paroxysmal Sweats: No Perspiration


Orientation: 0-Oriented


Tacttile Disturbances: 0-None


Auditory Disturbances: 0-None


Visual Disturbances: 0-None


Headache: 0-None Present


CIWA-Ar Total Score: 0





BHS Progress Note (SOAP)


Subjective: 





PT states he is doing well with minimal withdrawal Sx on the detox protocol





 Vital Signs - 24 hr











  18





  15:58 17:22 21:16


 


Temperature 96.6 F L 96.6 F L 97.9 F


 


Pulse Rate 84 89 70


 


Respiratory 16 18 18





Rate   


 


Blood Pressure 142/78 127/82 138/75














  18





  00:30 03:30 06:06


 


Temperature   97.6 F


 


Pulse Rate   65


 


Respiratory 18 18 18





Rate   


 


Blood Pressure   132/76














  18





  09:57 13:36


 


Temperature 96.6 F L 98.3 F


 


Pulse Rate 75 86


 


Respiratory 18 18





Rate  


 


Blood Pressure 133/71 120/70








 Laboratory Tests











  18





  14:09 05:45 05:45


 


WBC   7.8 


 


RBC   4.88 


 


Hgb   13.6 


 


Hct   42.4 


 


MCV   86.8 


 


MCH   27.8 


 


MCHC   32.0 


 


RDW   14.4 


 


Plt Count   258 


 


MPV   9.2 


 


Sodium    138


 


Potassium    4.1


 


Chloride    102


 


Carbon Dioxide    28


 


Anion Gap    8


 


BUN    14


 


Creatinine    1.3


 


Creat Clearance w eGFR    57.10


 


POC Glucometer  109  


 


Random Glucose    93


 


Calcium    9.0


 


Total Bilirubin    0.3


 


AST    50 H


 


ALT    46


 


Alkaline Phosphatase    50


 


Total Protein    7.5


 


Albumin    3.8


 


RPR Titer   














  18





  05:45


 


WBC 


 


RBC 


 


Hgb 


 


Hct 


 


MCV 


 


MCH 


 


MCHC 


 


RDW 


 


Plt Count 


 


MPV 


 


Sodium 


 


Potassium 


 


Chloride 


 


Carbon Dioxide 


 


Anion Gap 


 


BUN 


 


Creatinine 


 


Creat Clearance w eGFR 


 


POC Glucometer 


 


Random Glucose 


 


Calcium 


 


Total Bilirubin 


 


AST 


 


ALT 


 


Alkaline Phosphatase 


 


Total Protein 


 


Albumin 


 


RPR Titer  Nonreactive








a/p: continue alcohol detox protocol- pt has prn meds for symptom relief

## 2018-12-20 RX ADMIN — ASPIRIN SCH MG: 81 TABLET, COATED ORAL at 10:26

## 2018-12-20 RX ADMIN — BUDESONIDE AND FORMOTEROL FUMARATE DIHYDRATE SCH PUFF: 160; 4.5 AEROSOL RESPIRATORY (INHALATION) at 22:19

## 2018-12-20 RX ADMIN — PAROXETINE HYDROCHLORIDE SCH MG: 10 TABLET, FILM COATED ORAL at 10:26

## 2018-12-20 RX ADMIN — Medication SCH TAB: at 10:26

## 2018-12-20 RX ADMIN — RANITIDINE SCH MG: 150 TABLET ORAL at 10:26

## 2018-12-20 RX ADMIN — BUDESONIDE AND FORMOTEROL FUMARATE DIHYDRATE SCH PUFF: 160; 4.5 AEROSOL RESPIRATORY (INHALATION) at 10:26

## 2018-12-20 RX ADMIN — GUAIFENESIN AND DEXTROMETHORPHAN PRN ML: 100; 10 SYRUP ORAL at 22:22

## 2018-12-20 RX ADMIN — TRAZODONE HYDROCHLORIDE SCH MG: 100 TABLET ORAL at 22:19

## 2018-12-20 RX ADMIN — LISINOPRIL SCH MG: 10 TABLET ORAL at 10:26

## 2018-12-20 RX ADMIN — Medication SCH MG: at 22:19

## 2018-12-20 RX ADMIN — AMOXICILLIN SCH MG: 500 CAPSULE ORAL at 22:19

## 2018-12-20 RX ADMIN — GUAIFENESIN AND DEXTROMETHORPHAN PRN ML: 100; 10 SYRUP ORAL at 17:49

## 2018-12-20 NOTE — PN
S CIWA





- CIWA Score


Nausea/Vomitin-No Nausea/No Vomiting


Muscle Tremors: 2


Anxiety: 2


Agitation: 2


Paroxysmal Sweats: 1-Minimal Palms Moist


Orientation: 0-Oriented


Tacttile Disturbances: 0-None


Auditory Disturbances: 0-None


Visual Disturbances: 0-None


Headache: 2-Mild


CIWA-Ar Total Score: 9





BHS Progress Note (SOAP)


Subjective: 





PATIENT C/O MILD HEADACHE, SHAKES, SWEATING AND ANXIETY. HAS H/O TOBACCO USE 

AND C/O PRODUCTIVE COUGH WITH YELLOW PHLEGM. DENIES SOB, CHEST PAIN AND SORE 

THROAT. 


18 12:20


Objective: 





1


 Vital Signs











Temperature  96.9 F L  18 09:22


 


Pulse Rate  99 H  18 09:22


 


Respiratory Rate  20   18 09:22


 


Blood Pressure  155/82   18 09:22


 


O2 Sat by Pulse Oximetry (%)      








 Laboratory Tests











  18





  14:09 05:45 05:45


 


WBC   7.8 


 


RBC   4.88 


 


Hgb   13.6 


 


Hct   42.4 


 


MCV   86.8 


 


MCH   27.8 


 


MCHC   32.0 


 


RDW   14.4 


 


Plt Count   258 


 


MPV   9.2 


 


Sodium    138


 


Potassium    4.1


 


Chloride    102


 


Carbon Dioxide    28


 


Anion Gap    8


 


BUN    14


 


Creatinine    1.3


 


Creat Clearance w eGFR    57.10


 


POC Glucometer  109  


 


Random Glucose    93


 


Calcium    9.0


 


Total Bilirubin    0.3


 


AST    50 H


 


ALT    46


 


Alkaline Phosphatase    50


 


Total Protein    7.5


 


Albumin    3.8


 


RPR Titer   














  18





  05:45


 


WBC 


 


RBC 


 


Hgb 


 


Hct 


 


MCV 


 


MCH 


 


MCHC 


 


RDW 


 


Plt Count 


 


MPV 


 


Sodium 


 


Potassium 


 


Chloride 


 


Carbon Dioxide 


 


Anion Gap 


 


BUN 


 


Creatinine 


 


Creat Clearance w eGFR 


 


POC Glucometer 


 


Random Glucose 


 


Calcium 


 


Total Bilirubin 


 


AST 


 


ALT 


 


Alkaline Phosphatase 


 


Total Protein 


 


Albumin 


 


RPR Titer  Nonreactive








PE:





ALERT AND ORIENTED X 3


SKIN MILD MOISTURE TO PALMS, WARM


CAR S1S2


RESP CTA AT BASES B/L, NO WHEEZING OR RALES, RHONCHI CLEARS WITH COUGHING


EXT FULL ROM





Assessment: 





18 12:21


PRODUCTIVE COUGH/URI


WITHDRAWAL SX





Plan: 





CONTINUE DETOX


DUE TO TOBACCO USE, WILL ORDER AMOXICILLIN 500MG BID X 7 DAYS


ENCOURAGE ORAL FLUIDS


CONTINUE TO MONITOR

## 2018-12-21 VITALS — TEMPERATURE: 97.5 F | DIASTOLIC BLOOD PRESSURE: 58 MMHG | SYSTOLIC BLOOD PRESSURE: 111 MMHG | HEART RATE: 87 BPM

## 2018-12-21 RX ADMIN — RANITIDINE SCH MG: 150 TABLET ORAL at 10:13

## 2018-12-21 RX ADMIN — PAROXETINE HYDROCHLORIDE SCH MG: 10 TABLET, FILM COATED ORAL at 10:15

## 2018-12-21 RX ADMIN — BUDESONIDE AND FORMOTEROL FUMARATE DIHYDRATE SCH PUFF: 160; 4.5 AEROSOL RESPIRATORY (INHALATION) at 10:12

## 2018-12-21 RX ADMIN — LISINOPRIL SCH MG: 10 TABLET ORAL at 10:13

## 2018-12-21 RX ADMIN — Medication SCH TAB: at 10:13

## 2018-12-21 RX ADMIN — AMOXICILLIN SCH MG: 500 CAPSULE ORAL at 10:13

## 2018-12-21 RX ADMIN — ASPIRIN SCH MG: 81 TABLET, COATED ORAL at 10:13

## 2018-12-21 NOTE — DS
BHS Detox Discharge Summary


Admission Date: 


12/18/18





Discharge Date: 12/21/18





- History


Present History: Alcohol Dependence, Cocaine Dependence, Sedative Dependence


Pertinent Past History: 





pt admitted for alcohol detox- did well, was empirically treated for bronchitis 

with antibiotics- clear lungs and no fever. But pt is a smoker











- Physical Exam Results


Vital Signs: 


 Vital Signs











Temperature  97.5 F L  12/21/18 13:32


 


Pulse Rate  87   12/21/18 13:32


 


Respiratory Rate  18   12/21/18 13:32


 


Blood Pressure  111/58 L  12/21/18 13:32


 


O2 Sat by Pulse Oximetry (%)      














- Treatment


Hospital Course: Detox Protocol Followed, Detoxed Safely, Responded well, 

Discharged Condition Good, Rehab Referral Accepted





- Medication


Discharge Medications: 


Ambulatory Orders





Albuterol Sulfate Inhaler - [Ventolin HFA Inhaler -] 2 inh IH Q4H PRN #1 

canister 02/16/15 


Thiamine HCl [Vitamin B1 -] 100 mg PO HS #30 tablet 02/16/15 


Aspirin [Ecotrin] 81 mg PO DAILY 04/08/16 


Fluticasone/Salmeterol [Advair 500-50 Diskus] 1 puff IH BID 09/14/16 


Folic Acid - 1 mg PO DAILY 06/15/18 


traZODone HCL [Trazodone HCl] 100 mg PO HS 06/15/18 


Simvastatin [Zocor -] 40 mg PO HS #30 tablet 06/20/18 


Famotidine [Pepcid -] 20 mg PO DAILY 12/18/18 


Folic Acid - 1 mg PO DAILY 12/18/18 


Gabapentin [Neurontin -] 300 mg PO BID 12/18/18 


Gabapentin [Neurontin -] 400 mg PO HS 12/18/18 


Lisinopril [Prinivil] 10 mg PO DAILY 12/18/18 


Multivitamins [Tab-A-Vit -] 1 tab PO DAILY 12/18/18 


Paroxetine HCl [Paxil -] 20 mg PO DAILY 12/18/18 


Risperidone [Risperdal] 3 mg PO BID 12/18/18 











- Diagnosis


(1) Alcohol dependence with uncomplicated withdrawal


Current Visit: Yes   Status: Acute   





- AMA


Did Patient Leave Against Medical Advice: No

## 2018-12-21 NOTE — PN
BHS Progress Note


Note: 





pt was supposed to go to rehab bed here- did not get authorization- did get a 

bed in another rehab facility- pt has to go there this suad- discharge done- pt 

states does not need any meds- has them in his property. Will not continue 

antibiotics- no clear indication for this and pt had nl PE and nl VS and most 

infections are viral/

## 2018-12-21 NOTE — PN
BHS Progress Note (SOAP)


Subjective: 





Pt states still with cough and phlegm, was put on antibiotics yesterday.





O:








lungs clear





 Vital Signs - 24 hr











  12/20/18 12/20/18 12/20/18





  13:32 17:42 21:20


 


Temperature 97.1 F L 97.4 F L 97.1 F L


 


Pulse Rate 97 H 78 73


 


Respiratory 18 18 18





Rate   


 


Blood Pressure 145/76 138/76 131/75














  12/21/18 12/21/18 12/21/18





  00:30 03:18 06:24


 


Temperature   97 F L


 


Pulse Rate   63


 


Respiratory 18 18 18





Rate   


 


Blood Pressure   111/65














  12/21/18





  09:20


 


Temperature 97.1 F L


 


Pulse Rate 85


 


Respiratory 18





Rate 


 


Blood Pressure 121/71








 Laboratory Tests











  12/18/18 12/19/18 12/19/18





  14:09 05:45 05:45


 


WBC   7.8 


 


RBC   4.88 


 


Hgb   13.6 


 


Hct   42.4 


 


MCV   86.8 


 


MCH   27.8 


 


MCHC   32.0 


 


RDW   14.4 


 


Plt Count   258 


 


MPV   9.2 


 


Sodium    138


 


Potassium    4.1


 


Chloride    102


 


Carbon Dioxide    28


 


Anion Gap    8


 


BUN    14


 


Creatinine    1.3


 


Creat Clearance w eGFR    57.10


 


POC Glucometer  109  


 


Random Glucose    93


 


Calcium    9.0


 


Total Bilirubin    0.3


 


AST    50 H


 


ALT    46


 


Alkaline Phosphatase    50


 


Total Protein    7.5


 


Albumin    3.8


 


RPR Titer   














  12/19/18





  05:45


 


WBC 


 


RBC 


 


Hgb 


 


Hct 


 


MCV 


 


MCH 


 


MCHC 


 


RDW 


 


Plt Count 


 


MPV 


 


Sodium 


 


Potassium 


 


Chloride 


 


Carbon Dioxide 


 


Anion Gap 


 


BUN 


 


Creatinine 


 


Creat Clearance w eGFR 


 


POC Glucometer 


 


Random Glucose 


 


Calcium 


 


Total Bilirubin 


 


AST 


 


ALT 


 


Alkaline Phosphatase 


 


Total Protein 


 


Albumin 


 


RPR Titer  Nonreactive








a/p: continue detox protocol


pt was given rehab bed today- to be d/c'd after pm dose of med


on calcium supplement

## 2019-07-16 ENCOUNTER — HOSPITAL ENCOUNTER (INPATIENT)
Dept: HOSPITAL 74 - YASAS | Age: 59
LOS: 4 days | Discharge: OTHER INSTITUTION | DRG: 897 | End: 2019-07-20
Attending: SURGERY | Admitting: SURGERY
Payer: COMMERCIAL

## 2019-07-16 VITALS — BODY MASS INDEX: 30.5 KG/M2

## 2019-07-16 DIAGNOSIS — E78.00: ICD-10-CM

## 2019-07-16 DIAGNOSIS — F43.10: ICD-10-CM

## 2019-07-16 DIAGNOSIS — Z91.5: ICD-10-CM

## 2019-07-16 DIAGNOSIS — F10.230: Primary | ICD-10-CM

## 2019-07-16 DIAGNOSIS — F25.9: ICD-10-CM

## 2019-07-16 DIAGNOSIS — I10: ICD-10-CM

## 2019-07-16 DIAGNOSIS — F17.210: ICD-10-CM

## 2019-07-16 DIAGNOSIS — R79.89: ICD-10-CM

## 2019-07-16 DIAGNOSIS — F14.10: ICD-10-CM

## 2019-07-16 DIAGNOSIS — F12.20: ICD-10-CM

## 2019-07-16 PROCEDURE — HZ2ZZZZ DETOXIFICATION SERVICES FOR SUBSTANCE ABUSE TREATMENT: ICD-10-PCS | Performed by: SURGERY

## 2019-07-16 RX ADMIN — Medication SCH MG: at 22:05

## 2019-07-16 RX ADMIN — ATORVASTATIN CALCIUM SCH MG: 20 TABLET, FILM COATED ORAL at 22:05

## 2019-07-16 RX ADMIN — ASPIRIN SCH MG: 81 TABLET, COATED ORAL at 20:09

## 2019-07-16 RX ADMIN — ALBUTEROL SULFATE SCH: 90 AEROSOL, METERED RESPIRATORY (INHALATION) at 22:06

## 2019-07-16 RX ADMIN — BUDESONIDE AND FORMOTEROL FUMARATE DIHYDRATE SCH PUFF: 160; 4.5 AEROSOL RESPIRATORY (INHALATION) at 22:05
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
## 2019-07-16 NOTE — PN
Teaching Attending Note


Name of Resident: Abigail Quintana





ATTENDING PHYSICIAN STATEMENT





I saw and evaluated the patient.


I reviewed the resident's note and discussed the case with the resident.


I agree with the resident's findings and plan as documented.








SUBJECTIVE: pt here requesting detox from etoh use  , reports 4 x 24  oz  beer  

and 1  pint  liquor daily after d/c from detox  , most recently [facility]  May 

2019  , denies w/d seizures  , +  blackouts  1  week ago  , + tremors if  not 

drinking  ,reports that  he starts drinking alcohol  in the  mornings around  6-

7 am  , current  symptoms as above  . Previous detox at  this  facility 

December 2018  . 


cocaine :  1-2 x  / month 


cannabis : 3-4 blunts/day  








PMHx asthma, HTN, HLD, bipolar disorder, anxiety, depression , schizophrenia  , 

appy  . 








 This report was requested by: Darya Andrea | Reference #: [id number]


Others' Prescriptions


Patient Name:    Sintia Eldridge    YOB: 1960


Address:    [address] [place] [address]    Sex:    Male


Rx Written    Rx Dispensed    Drug    Quantity    Days Supply    Prescriber Name


07/02/2019 07/02/2019    zolpidem tartrate 10 mg tablet    30    30    

Morley, Ruby V


06/18/2019 06/19/2019    zolpidem tartrate 5 mg tablet    15    15    

Ruby Morley





Patient Name:    Sintia Eldridge    YOB: 1960


Address:    [place] [address]    Sex:    Male


Rx Written    Rx Dispensed    Drug    Quantity    Days Supply    Prescriber Name


05/03/2019 05/06/2019    chlordiazepoxide 25 mg capsule    8    2    Geovani Lord (PA)


01/16/2019 01/17/2019    chlordiazepoxide 25 mg capsule    8    2    Mert Thompson


12/13/2018 12/13/2018    chlordiazepoxide 25 mg capsule    8    2    Sylvie Campos MD


10/17/2018    10/17/2018    chlordiazepoxide 25 mg capsule    8    2    Mert Thompson


09/27/2018 09/28/2018    chlordiazepoxide 25 mg capsule    12    3    Sylvie Campos MD





Patient Name:    Sintia Eldridge    YOB: 1960


Address:    [address] [place] [id number]    Sex:    Male


Rx Written    Rx Dispensed    Drug    Quantity    Days Supply    Prescriber Name


07/18/2018 07/21/2018    chlordiazepoxide 10 mg capsule    2    1    

Chasidy Andrews MD





OBJECTIVE: wnwd  , anxious , mild  UE tremors 








ASSESSMENT AND PLAN: etoh dependence  -  Librium taper .

## 2019-07-17 LAB
ALBUMIN SERPL-MCNC: 3.4 G/DL (ref 3.4–5)
ALP SERPL-CCNC: 100 U/L (ref 45–117)
ALT SERPL-CCNC: 22 U/L (ref 13–61)
ANION GAP SERPL CALC-SCNC: 7 MMOL/L (ref 8–16)
AST SERPL-CCNC: 11 U/L (ref 15–37)
BILIRUB SERPL-MCNC: 0.6 MG/DL (ref 0.2–1)
BUN SERPL-MCNC: 21.8 MG/DL (ref 7–18)
CALCIUM SERPL-MCNC: 8.8 MG/DL (ref 8.5–10.1)
CHLORIDE SERPL-SCNC: 108 MMOL/L (ref 98–107)
CO2 SERPL-SCNC: 27 MMOL/L (ref 21–32)
CREAT SERPL-MCNC: 1.4 MG/DL (ref 0.55–1.3)
DEPRECATED RDW RBC AUTO: 14.1 % (ref 11.9–15.9)
GLUCOSE SERPL-MCNC: 94 MG/DL (ref 74–106)
HCT VFR BLD CALC: 39.1 % (ref 35.4–49)
HGB BLD-MCNC: 13.1 GM/DL (ref 11.7–16.9)
MCH RBC QN AUTO: 29.3 PG (ref 25.7–33.7)
MCHC RBC AUTO-ENTMCNC: 33.4 G/DL (ref 32–35.9)
MCV RBC: 87.7 FL (ref 80–96)
PLATELET # BLD AUTO: 277 K/MM3 (ref 134–434)
PMV BLD: 8.1 FL (ref 7.5–11.1)
POTASSIUM SERPLBLD-SCNC: 4.4 MMOL/L (ref 3.5–5.1)
PROT SERPL-MCNC: 6.5 G/DL (ref 6.4–8.2)
RBC # BLD AUTO: 4.46 M/MM3 (ref 4–5.6)
SODIUM SERPL-SCNC: 142 MMOL/L (ref 136–145)
WBC # BLD AUTO: 6.6 K/MM3 (ref 4–10)

## 2019-07-17 RX ADMIN — ALBUTEROL SULFATE SCH: 90 AEROSOL, METERED RESPIRATORY (INHALATION) at 06:45

## 2019-07-17 RX ADMIN — ATORVASTATIN CALCIUM SCH MG: 20 TABLET, FILM COATED ORAL at 22:22

## 2019-07-17 RX ADMIN — ASPIRIN SCH MG: 81 TABLET, COATED ORAL at 10:42

## 2019-07-17 RX ADMIN — Medication SCH TAB: at 10:42

## 2019-07-17 RX ADMIN — NICOTINE SCH: 7 PATCH TRANSDERMAL at 10:42

## 2019-07-17 RX ADMIN — LISINOPRIL SCH MG: 20 TABLET ORAL at 10:41

## 2019-07-17 RX ADMIN — PRAZOSIN HYDROCHLORIDE SCH MG: 1 CAPSULE ORAL at 22:26

## 2019-07-17 RX ADMIN — ALBUTEROL SULFATE SCH: 90 AEROSOL, METERED RESPIRATORY (INHALATION) at 15:14

## 2019-07-17 RX ADMIN — Medication SCH MG: at 22:22

## 2019-07-17 RX ADMIN — BUDESONIDE AND FORMOTEROL FUMARATE DIHYDRATE SCH: 160; 4.5 AEROSOL RESPIRATORY (INHALATION) at 22:22

## 2019-07-17 RX ADMIN — ALBUTEROL SULFATE SCH: 90 AEROSOL, METERED RESPIRATORY (INHALATION) at 22:27

## 2019-07-17 RX ADMIN — LURASIDONE HYDROCHLORIDE SCH MG: 40 TABLET, FILM COATED ORAL at 18:11

## 2019-07-17 RX ADMIN — BUDESONIDE AND FORMOTEROL FUMARATE DIHYDRATE SCH PUFF: 160; 4.5 AEROSOL RESPIRATORY (INHALATION) at 10:41
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
## 2019-07-17 NOTE — PN
[facility] CIWA





- CIWA Score


Nausea/Vomitin-Mild Nausea/No Vomiting


Muscle Tremors: 3


Anxiety: 2


Agitation: 2


Paroxysmal Sweats: 1-Minimal Palms Moist


Orientation: 0-Oriented


Tacttile Disturbances: 0-None


Auditory Disturbances: 0-None


Visual Disturbances: 0-None


Headache: 0-None Present


CIWA-Ar Total Score: 9





[facility] Progress Note (SOAP)


Subjective: 





report history of bipolar II and taking psychotropic medication, last dose 

unknown


psychiatrist consultation waiting for recommendation


patient is calm and cooperative


Objective: 





19 13:12


 Vital Signs











Temperature  98.2 F   19 09:24


 


Pulse Rate  87   19 09:24


 


Respiratory Rate  18   19 09:24


 


Blood Pressure  122/77   19 09:24


 


O2 Sat by Pulse Oximetry (%)      








 Laboratory Last Values











WBC  6.6 K/mm3 (4.0-10.0)   19  07:00    


 


RBC  4.46 M/mm3 (4.00-5.60)   19  07:00    


 


Hgb  13.1 GM/dL (11.7-16.9)   19  07:00    


 


Hct  39.1 % (35.4-49)   19  07:00    


 


MCV  87.7 fl (80-96)   19  07:00    


 


MCH  29.3 pg (25.7-33.7)   19  07:00    


 


MCHC  33.4 g/dl (32.0-35.9)   19  07:00    


 


RDW  14.1 % (11.9-15.9)   19  07:00    


 


Plt Count  277 K/MM3 (134-434)   19  07:00    


 


MPV  8.1 fl (7.5-11.1)  D 19  07:00    


 


Sodium  142 mmol/L (136-145)   19  07:00    


 


Potassium  4.4 mmol/L (3.5-5.1)   19  07:00    


 


Chloride  108 mmol/L ()  H  19  07:00    


 


Carbon Dioxide  27 mmol/L (21-32)   19  07:00    


 


Anion Gap  7 MMOL/L (8-16)  L  19  07:00    


 


BUN  21.8 mg/dL (7-18)  H  19  07:00    


 


Creatinine  1.4 mg/dL (0.55-1.3)  H  19  07:00    


 


Est GFR (CKD-EPI)AfAm  64.18   19  07:00    


 


Est GFR (CKD-EPI)NonAf  55.38   19  07:00    


 


Random Glucose  94 mg/dL ()   19  07:00    


 


Calcium  8.8 mg/dL (8.5-10.1)   19  07:00    


 


Total Bilirubin  0.6 mg/dL (0.2-1)   19  07:00    


 


AST  11 U/L (15-37)  L  19  07:00    


 


ALT  22 U/L (13-61)   19  07:00    


 


Alkaline Phosphatase  100 U/L ()   19  07:00    


 


Total Protein  6.5 g/dl (6.4-8.2)   19  07:00    


 


Albumin  3.4 g/dl (3.4-5.0)   19  07:00    


 


RPR Titer  Nonreactive  (NONREACTIVE)   19  07:00    








lab noted


Assessment: 





19 13:14


alcohol withdrawal sx


Plan: 





continue alcohol detox

## 2019-07-18 RX ADMIN — ESCITALOPRAM SCH MG: 20 TABLET, FILM COATED ORAL at 10:42

## 2019-07-18 RX ADMIN — BUDESONIDE AND FORMOTEROL FUMARATE DIHYDRATE SCH PUFF: 160; 4.5 AEROSOL RESPIRATORY (INHALATION) at 22:25

## 2019-07-18 RX ADMIN — ATORVASTATIN CALCIUM SCH MG: 20 TABLET, FILM COATED ORAL at 22:23

## 2019-07-18 RX ADMIN — BUDESONIDE AND FORMOTEROL FUMARATE DIHYDRATE SCH PUFF: 160; 4.5 AEROSOL RESPIRATORY (INHALATION) at 10:43

## 2019-07-18 RX ADMIN — Medication SCH MG: at 22:22

## 2019-07-18 RX ADMIN — ALBUTEROL SULFATE SCH PUFF: 90 AEROSOL, METERED RESPIRATORY (INHALATION) at 22:25

## 2019-07-18 RX ADMIN — ALBUTEROL SULFATE SCH: 90 AEROSOL, METERED RESPIRATORY (INHALATION) at 06:29

## 2019-07-18 RX ADMIN — ALBUTEROL SULFATE SCH: 90 AEROSOL, METERED RESPIRATORY (INHALATION) at 13:16

## 2019-07-18 RX ADMIN — LURASIDONE HYDROCHLORIDE SCH MG: 40 TABLET, FILM COATED ORAL at 17:36

## 2019-07-18 RX ADMIN — ASPIRIN SCH MG: 81 TABLET, COATED ORAL at 10:42

## 2019-07-18 RX ADMIN — Medication SCH TAB: at 10:42

## 2019-07-18 RX ADMIN — LISINOPRIL SCH MG: 20 TABLET ORAL at 10:42

## 2019-07-18 RX ADMIN — NICOTINE SCH: 7 PATCH TRANSDERMAL at 10:43

## 2019-07-18 RX ADMIN — PRAZOSIN HYDROCHLORIDE SCH MG: 1 CAPSULE ORAL at 22:23

## 2019-07-18 NOTE — PN
[facility] CIWA





- CIWA Score


Nausea/Vomitin-No Nausea/No Vomiting


Muscle Tremors: 2


Anxiety: 2


Agitation: 2


Paroxysmal Sweats: No Perspiration


Orientation: 0-Oriented


Tacttile Disturbances: 0-None


Auditory Disturbances: 0-None


Visual Disturbances: 0-None


Headache: 1-Very Mild


CIWA-Ar Total Score: 7





[facility] Progress Note (SOAP)


Subjective: 





feeling ok today discuss aftercare with staff the [facility]


Objective: 





19 14:03


 Vital Signs











Temperature  97.1 F L  19 13:29


 


Pulse Rate  68   19 13:29


 


Respiratory Rate  18   19 13:29


 


Blood Pressure  137/74   19 13:29


 


O2 Sat by Pulse Oximetry (%)      








 Laboratory Last Values











WBC  6.6 K/mm3 (4.0-10.0)   19  07:00    


 


RBC  4.46 M/mm3 (4.00-5.60)   19  07:00    


 


Hgb  13.1 GM/dL (11.7-16.9)   19  07:00    


 


Hct  39.1 % (35.4-49)   19  07:00    


 


MCV  87.7 fl (80-96)   19  07:00    


 


MCH  29.3 pg (25.7-33.7)   19  07:00    


 


MCHC  33.4 g/dl (32.0-35.9)   19  07:00    


 


RDW  14.1 % (11.9-15.9)   19  07:00    


 


Plt Count  277 K/MM3 (134-434)   19  07:00    


 


MPV  8.1 fl (7.5-11.1)  D 19  07:00    


 


Sodium  142 mmol/L (136-145)   19  07:00    


 


Potassium  4.4 mmol/L (3.5-5.1)   19  07:00    


 


Chloride  108 mmol/L ()  H  19  07:00    


 


Carbon Dioxide  27 mmol/L (21-32)   19  07:00    


 


Anion Gap  7 MMOL/L (8-16)  L  19  07:00    


 


BUN  21.8 mg/dL (7-18)  H  19  07:00    


 


Creatinine  1.4 mg/dL (0.55-1.3)  H  19  07:00    


 


Est GFR (CKD-EPI)AfAm  64.18   19  07:00    


 


Est GFR (CKD-EPI)NonAf  55.38   19  07:00    


 


Random Glucose  94 mg/dL ()   19  07:00    


 


Calcium  8.8 mg/dL (8.5-10.1)   19  07:00    


 


Total Bilirubin  0.6 mg/dL (0.2-1)   19  07:00    


 


AST  11 U/L (15-37)  L  19  07:00    


 


ALT  22 U/L (13-61)   19  07:00    


 


Alkaline Phosphatase  100 U/L ()   19  07:00    


 


Total Protein  6.5 g/dl (6.4-8.2)   19  07:00    


 


Albumin  3.4 g/dl (3.4-5.0)   19  07:00    


 


RPR Titer  Nonreactive  (NONREACTIVE)   19  07:00    








lab noted


Assessment: 





19 14:03


alcohol withdrawal sx


Plan: 





continue alcohol detox

## 2019-07-19 RX ADMIN — LURASIDONE HYDROCHLORIDE SCH MG: 40 TABLET, FILM COATED ORAL at 17:13

## 2019-07-19 RX ADMIN — Medication SCH TAB: at 09:58

## 2019-07-19 RX ADMIN — LISINOPRIL SCH MG: 20 TABLET ORAL at 09:58

## 2019-07-19 RX ADMIN — NICOTINE SCH: 7 PATCH TRANSDERMAL at 09:58

## 2019-07-19 RX ADMIN — PRAZOSIN HYDROCHLORIDE SCH MG: 1 CAPSULE ORAL at 21:33

## 2019-07-19 RX ADMIN — ALBUTEROL SULFATE SCH: 90 AEROSOL, METERED RESPIRATORY (INHALATION) at 21:03

## 2019-07-19 RX ADMIN — ALBUTEROL SULFATE SCH: 90 AEROSOL, METERED RESPIRATORY (INHALATION) at 14:41

## 2019-07-19 RX ADMIN — BUDESONIDE AND FORMOTEROL FUMARATE DIHYDRATE SCH: 160; 4.5 AEROSOL RESPIRATORY (INHALATION) at 21:03

## 2019-07-19 RX ADMIN — BUDESONIDE AND FORMOTEROL FUMARATE DIHYDRATE SCH PUFF: 160; 4.5 AEROSOL RESPIRATORY (INHALATION) at 09:58

## 2019-07-19 RX ADMIN — ASPIRIN SCH MG: 81 TABLET, COATED ORAL at 09:59

## 2019-07-19 RX ADMIN — Medication SCH MG: at 21:02

## 2019-07-19 RX ADMIN — ESCITALOPRAM SCH MG: 20 TABLET, FILM COATED ORAL at 09:58

## 2019-07-19 RX ADMIN — ATORVASTATIN CALCIUM SCH MG: 20 TABLET, FILM COATED ORAL at 21:02

## 2019-07-19 RX ADMIN — Medication SCH MG: at 06:50

## 2019-07-19 RX ADMIN — ALBUTEROL SULFATE SCH: 90 AEROSOL, METERED RESPIRATORY (INHALATION) at 06:50

## 2019-07-19 NOTE — PN
[facility] CIWA





- CIWA Score


Nausea/Vomitin-No Nausea/No Vomiting


Muscle Tremors: None


Anxiety: 0-No Anxiety, at Ease


Agitation: 1-Slight > Activity


Paroxysmal Sweats: No Perspiration


Orientation: 0-Oriented


Tacttile Disturbances: 0-None


Auditory Disturbances: 0-None


Visual Disturbances: 0-None


Headache: 0-None Present


CIWA-Ar Total Score: 1





[facility] Progress Note (SOAP)


Subjective: 





Patient denies current Withdrawal / Detox symptoms and reports that he feels 

well overall at this time.


Objective: 


PATIENT A & O X 3, OBSERVED AMBULATING ON UNIT UNASSISTED. IN NO ACUTE DISTRESS.





19 14:51


 Vital Signs











Temperature  97.1 F L  19 13:41


 


Pulse Rate  70   19 13:41


 


Respiratory Rate  16   19 13:41


 


Blood Pressure  121/65   19 13:41


 


O2 Sat by Pulse Oximetry (%)      








 Laboratory Tests











  19





  07:00 07:00 07:00


 


WBC  6.6  


 


RBC  4.46  


 


Hgb  13.1  


 


Hct  39.1  


 


MCV  87.7  


 


MCH  29.3  


 


MCHC  33.4  


 


RDW  14.1  


 


Plt Count  277  


 


MPV  8.1  D  


 


Sodium   142 


 


Potassium   4.4 


 


Chloride   108 H 


 


Carbon Dioxide   27 


 


Anion Gap   7 L 


 


BUN   21.8 H 


 


Creatinine   1.4 H 


 


Est GFR (CKD-EPI)AfAm   64.18 


 


Est GFR (CKD-EPI)NonAf   55.38 


 


Random Glucose   94 


 


Calcium   8.8 


 


Total Bilirubin   0.6 


 


AST   11 L 


 


ALT   22 


 


Alkaline Phosphatase   100 


 


Total Protein   6.5 


 


Albumin   3.4 


 


RPR Titer    Nonreactive








LABS NOTED.


Assessment: 





19 14:51


WITHDRAWAL SYMPTOMS.


AZOTEMIA.





19 14:53





Plan: 





CONTINUE DETOX.


INCREASE DAILY PO WATER INTAKE.





PATIENT SCHEDULED FOR D/C TOMORROW.

## 2019-07-20 ENCOUNTER — HOSPITAL ENCOUNTER (INPATIENT)
Dept: HOSPITAL 74 - YASAS | Age: 59
LOS: 13 days | Discharge: HOME | DRG: 895 | End: 2019-08-02
Attending: NEUROMUSCULOSKELETAL MEDICINE & OMM | Admitting: NEUROMUSCULOSKELETAL MEDICINE & OMM
Payer: COMMERCIAL

## 2019-07-20 VITALS — TEMPERATURE: 96.8 F | HEART RATE: 75 BPM

## 2019-07-20 VITALS — SYSTOLIC BLOOD PRESSURE: 132 MMHG | DIASTOLIC BLOOD PRESSURE: 80 MMHG

## 2019-07-20 DIAGNOSIS — F12.10: ICD-10-CM

## 2019-07-20 DIAGNOSIS — I10: ICD-10-CM

## 2019-07-20 DIAGNOSIS — F10.20: Primary | ICD-10-CM

## 2019-07-20 DIAGNOSIS — F14.20: ICD-10-CM

## 2019-07-20 DIAGNOSIS — E78.00: ICD-10-CM

## 2019-07-20 DIAGNOSIS — F17.210: ICD-10-CM

## 2019-07-20 DIAGNOSIS — J45.909: ICD-10-CM

## 2019-07-20 PROCEDURE — HZ42ZZZ GROUP COUNSELING FOR SUBSTANCE ABUSE TREATMENT, COGNITIVE-BEHAVIORAL: ICD-10-PCS | Performed by: ALLERGY & IMMUNOLOGY

## 2019-07-20 RX ADMIN — LISINOPRIL SCH MG: 20 TABLET ORAL at 11:14

## 2019-07-20 RX ADMIN — NICOTINE SCH MG: 7 PATCH TRANSDERMAL at 11:15

## 2019-07-20 RX ADMIN — Medication SCH MG: at 21:32

## 2019-07-20 RX ADMIN — Medication SCH MG: at 06:32

## 2019-07-20 RX ADMIN — ESCITALOPRAM SCH MG: 20 TABLET, FILM COATED ORAL at 11:14

## 2019-07-20 RX ADMIN — ALBUTEROL SULFATE SCH: 90 AEROSOL, METERED RESPIRATORY (INHALATION) at 14:14

## 2019-07-20 RX ADMIN — ASPIRIN SCH MG: 81 TABLET, COATED ORAL at 11:15

## 2019-07-20 RX ADMIN — ATORVASTATIN CALCIUM SCH MG: 20 TABLET, FILM COATED ORAL at 21:34

## 2019-07-20 RX ADMIN — BUDESONIDE AND FORMOTEROL FUMARATE DIHYDRATE SCH PUFF: 160; 4.5 AEROSOL RESPIRATORY (INHALATION) at 11:15

## 2019-07-20 RX ADMIN — ALBUTEROL SULFATE SCH PUFF: 90 AEROSOL, METERED RESPIRATORY (INHALATION) at 06:32

## 2019-07-20 RX ADMIN — SUVOREXANT PRN MG: 10 TABLET, FILM COATED ORAL at 21:35

## 2019-07-20 RX ADMIN — Medication PRN MG: at 21:35

## 2019-07-20 RX ADMIN — PRAZOSIN HYDROCHLORIDE SCH MG: 1 CAPSULE ORAL at 21:33

## 2019-07-20 RX ADMIN — Medication SCH TAB: at 11:15

## 2019-07-20 RX ADMIN — BUDESONIDE AND FORMOTEROL FUMARATE DIHYDRATE SCH: 160; 4.5 AEROSOL RESPIRATORY (INHALATION) at 21:32

## 2019-07-20 NOTE — DS
[facility] Detox Discharge Summary


Admission Date: 


07/16/19





Discharge Date: 07/20/19





- History


Present History: Alcohol Dependence, Cannabis Dependence, Cocaine Dependence


Additional Comments: 





[facility] ([place], [place]) FOR AFTERCARE. 

PATIENT WAS DISCHARGED FROM DETOX UNIT TO BE TAKEN OVER TO REHAB UNIT IN STABLE 

MEDICAL CONDITION.


Pertinent Past History: 





Asthma, HTN, Hyperlipidemia, Bipolar Disorder, Anxiety, Depression, History Of 

Appendectomy, History Of Positive PPD (Treated), Azotemia.





- Physical Exam Results


Vital Signs: 


 Vital Signs











Temperature  96.8 F L  07/20/19 14:07


 


Pulse Rate  75   07/20/19 14:07


 


Respiratory Rate  18   07/20/19 14:07


 


Blood Pressure  132/80   07/20/19 14:07


 


O2 Sat by Pulse Oximetry (%)      











Pertinent Admission Physical Exam Findings: 





WITHDRAWAL SYMPTOMS.





 Laboratory Tests











  07/17/19 07/17/19 07/17/19





  07:00 07:00 07:00


 


WBC  6.6  


 


RBC  4.46  


 


Hgb  13.1  


 


Hct  39.1  


 


MCV  87.7  


 


MCH  29.3  


 


MCHC  33.4  


 


RDW  14.1  


 


Plt Count  277  


 


MPV  8.1  D  


 


Sodium   142 


 


Potassium   4.4 


 


Chloride   108 H 


 


Carbon Dioxide   27 


 


Anion Gap   7 L 


 


BUN   21.8 H 


 


Creatinine   1.4 H 


 


Est GFR (CKD-EPI)AfAm   64.18 


 


Est GFR (CKD-EPI)NonAf   55.38 


 


Random Glucose   94 


 


Calcium   8.8 


 


Total Bilirubin   0.6 


 


AST   11 L 


 


ALT   22 


 


Alkaline Phosphatase   100 


 


Total Protein   6.5 


 


Albumin   3.4 


 


RPR Titer    Nonreactive








LABS NOTED.





- Treatment


Hospital Course: Detox Protocol Followed, Detoxed Safely, Responded well, 

Discharged Condition Good, Rehab Referral Accepted


Patient has Accepted a Rehab Referral to: [facility] ([place], [place]).





- Medication


Discharge Medications: 


Ambulatory Orders





Fluticasone/Salmeterol [Advair 500-50 Diskus] 1 puff IH BID 09/14/16 


Albuterol Sulfate Inhaler - [Ventolin HFA Inhaler -] 2 inh IH TID PRN 07/16/19 


Aspirin [Ecotrin] 81 mg PO DAILY 07/16/19 


Escitalopram Oxalate [Lexapro -] 20 mg PO DAILY 07/16/19 


Folic Acid - 1 mg PO DAILY 07/16/19 


Lisinopril [Prinivil] 40 mg PO DAILY 07/16/19 


Lurasidone HCl [Latuda] 40 mg PO DAILY 07/16/19 


Multivitamins [Multivit ([facility] Formulary)] 1 tab PO DAILY 07/16/19 


Prazosin HCl 1 mg PO HS 07/16/19 


Ranitidine [Zantac -] 150 mg PO DAILY 07/16/19 


Simvastatin [Zocor -] 40 mg PO HS 07/16/19 


Thiamine HCl [Vitamin B1 -] 100 mg PO HS 07/16/19 


Zolpidem Tartrate 10 mg PO HS 07/16/19 


Omeprazole Magnesium 40 mg PO 07/18/19 











- Diagnosis


(1) Alcohol use disorder


Status: Acute   





(2) Cannabis abuse


Status: Acute   





(3) Cocaine use disorder


Status: Acute   





(4) Nicotine dependence


Status: Chronic   


Qualifiers: 


   Nicotine product type: cigarettes   Substance use status: uncomplicated   

Qualified Code(s): F17.210 - Nicotine dependence, cigarettes, uncomplicated   





(5) Posttraumatic stress disorder


Status: Chronic   





(6) Schizoaffective disorder


Status: Chronic   


Qualifiers: 


   Schizoaffective disorder type: unspecified   Qualified Code(s): F25.9 - 

Schizoaffective disorder, unspecified   





(7) Azotemia


Status: Acute   





- AMA


Did Patient Leave Against Medical Advice: No

## 2019-07-20 NOTE — HP
[facility] MD Rehab Assess/Revision





- Admission History


Date of Admission to Rehab: 07/20/2019





- Vital signs


Vital Signs: 


 Vital Signs











 Period  Temp  Pulse  Resp  BP Sys/Bauer  Pulse Ox


 


 Last 24 Hr  98.1 F  83  18  137/77  














- Findings


Detox History & Physical reviewed: Yes


Concur with findings: Yes


Comments/Additional Findings: PATIENT'S MEDICAL / MEDICATION HISTORY REVIEWED 

PRIOR TO DISCHARGE FROM DETOX UNIT. PATIENT WAS DISCHARGED FROM DETOX UNIT TO 

BE TAKEN OVER TO REHAB UNIT IN STABLE MEDICAL CONDITION.





Inpatient Rehab Admission





- Rehab Decision to Admit


Inpatient rehab admission?: Yes





- Initial Determination


Are CD services needed?: Yes


Free of communicable disease: Yes


Not in need of hospitalization: Yes





- Rehab Admission Criteria


Previous failed treatment: Yes


Poor recovery environment: Yes


Comorbidities: Yes


Lacks judgement: No


Patient is meeting Inpatient Rehab admission criteria:: Yes

## 2019-07-21 RX ADMIN — LURASIDONE HYDROCHLORIDE SCH MG: 40 TABLET, FILM COATED ORAL at 17:42

## 2019-07-21 RX ADMIN — LISINOPRIL SCH MG: 20 TABLET ORAL at 10:03

## 2019-07-21 RX ADMIN — PANTOPRAZOLE SODIUM SCH MG: 40 TABLET, DELAYED RELEASE ORAL at 10:03

## 2019-07-21 RX ADMIN — ATORVASTATIN CALCIUM SCH MG: 20 TABLET, FILM COATED ORAL at 21:17

## 2019-07-21 RX ADMIN — ESCITALOPRAM SCH MG: 20 TABLET, FILM COATED ORAL at 10:03

## 2019-07-21 RX ADMIN — BUDESONIDE AND FORMOTEROL FUMARATE DIHYDRATE SCH PUFF: 160; 4.5 AEROSOL RESPIRATORY (INHALATION) at 10:02

## 2019-07-21 RX ADMIN — Medication PRN MG: at 21:17

## 2019-07-21 RX ADMIN — SUVOREXANT PRN MG: 10 TABLET, FILM COATED ORAL at 21:19

## 2019-07-21 RX ADMIN — NICOTINE SCH: 7 PATCH TRANSDERMAL at 10:03

## 2019-07-21 RX ADMIN — Medication SCH MG: at 21:16

## 2019-07-21 RX ADMIN — PRAZOSIN HYDROCHLORIDE SCH MG: 1 CAPSULE ORAL at 21:17

## 2019-07-21 RX ADMIN — ASPIRIN SCH MG: 81 TABLET, COATED ORAL at 10:03

## 2019-07-21 RX ADMIN — BUDESONIDE AND FORMOTEROL FUMARATE DIHYDRATE SCH PUFF: 160; 4.5 AEROSOL RESPIRATORY (INHALATION) at 21:17

## 2019-07-21 RX ADMIN — Medication SCH TAB: at 10:03

## 2019-07-22 RX ADMIN — Medication PRN MG: at 21:20

## 2019-07-22 RX ADMIN — BUDESONIDE AND FORMOTEROL FUMARATE DIHYDRATE SCH PUFF: 160; 4.5 AEROSOL RESPIRATORY (INHALATION) at 09:53

## 2019-07-22 RX ADMIN — PANTOPRAZOLE SODIUM SCH MG: 40 TABLET, DELAYED RELEASE ORAL at 09:53

## 2019-07-22 RX ADMIN — Medication SCH TAB: at 09:53

## 2019-07-22 RX ADMIN — SUVOREXANT PRN MG: 10 TABLET, FILM COATED ORAL at 21:22

## 2019-07-22 RX ADMIN — ESCITALOPRAM SCH MG: 20 TABLET, FILM COATED ORAL at 09:53

## 2019-07-22 RX ADMIN — BUDESONIDE AND FORMOTEROL FUMARATE DIHYDRATE SCH: 160; 4.5 AEROSOL RESPIRATORY (INHALATION) at 21:21

## 2019-07-22 RX ADMIN — LISINOPRIL SCH MG: 20 TABLET ORAL at 10:35

## 2019-07-22 RX ADMIN — NICOTINE SCH: 7 PATCH TRANSDERMAL at 09:53

## 2019-07-22 RX ADMIN — ATORVASTATIN CALCIUM SCH MG: 20 TABLET, FILM COATED ORAL at 21:19

## 2019-07-22 RX ADMIN — ASPIRIN SCH MG: 81 TABLET, COATED ORAL at 09:53

## 2019-07-22 RX ADMIN — LURASIDONE HYDROCHLORIDE SCH MG: 40 TABLET, FILM COATED ORAL at 17:56

## 2019-07-22 RX ADMIN — Medication SCH MG: at 21:20

## 2019-07-22 RX ADMIN — PRAZOSIN HYDROCHLORIDE SCH MG: 1 CAPSULE ORAL at 21:20

## 2019-07-23 RX ADMIN — ATORVASTATIN CALCIUM SCH MG: 20 TABLET, FILM COATED ORAL at 21:23

## 2019-07-23 RX ADMIN — ESCITALOPRAM SCH MG: 20 TABLET, FILM COATED ORAL at 10:15

## 2019-07-23 RX ADMIN — LURASIDONE HYDROCHLORIDE SCH MG: 40 TABLET, FILM COATED ORAL at 17:53

## 2019-07-23 RX ADMIN — PANTOPRAZOLE SODIUM SCH MG: 40 TABLET, DELAYED RELEASE ORAL at 10:14

## 2019-07-23 RX ADMIN — ASPIRIN SCH MG: 81 TABLET, COATED ORAL at 10:15

## 2019-07-23 RX ADMIN — NICOTINE SCH: 7 PATCH TRANSDERMAL at 10:15

## 2019-07-23 RX ADMIN — Medication SCH MG: at 21:23

## 2019-07-23 RX ADMIN — LISINOPRIL SCH MG: 20 TABLET ORAL at 10:15

## 2019-07-23 RX ADMIN — BUDESONIDE AND FORMOTEROL FUMARATE DIHYDRATE SCH PUFF: 160; 4.5 AEROSOL RESPIRATORY (INHALATION) at 10:15

## 2019-07-23 RX ADMIN — PRAZOSIN HYDROCHLORIDE SCH MG: 1 CAPSULE ORAL at 21:23

## 2019-07-23 RX ADMIN — Medication SCH TAB: at 10:14

## 2019-07-23 RX ADMIN — SUVOREXANT PRN MG: 10 TABLET, FILM COATED ORAL at 21:26

## 2019-07-23 RX ADMIN — BUDESONIDE AND FORMOTEROL FUMARATE DIHYDRATE SCH PUFF: 160; 4.5 AEROSOL RESPIRATORY (INHALATION) at 21:24

## 2019-07-24 RX ADMIN — Medication SCH TAB: at 10:04

## 2019-07-24 RX ADMIN — LURASIDONE HYDROCHLORIDE SCH MG: 40 TABLET, FILM COATED ORAL at 17:58

## 2019-07-24 RX ADMIN — PRAZOSIN HYDROCHLORIDE SCH MG: 1 CAPSULE ORAL at 21:19

## 2019-07-24 RX ADMIN — ESCITALOPRAM SCH MG: 20 TABLET, FILM COATED ORAL at 10:04

## 2019-07-24 RX ADMIN — PANTOPRAZOLE SODIUM SCH MG: 40 TABLET, DELAYED RELEASE ORAL at 10:04

## 2019-07-24 RX ADMIN — ASPIRIN SCH MG: 81 TABLET, COATED ORAL at 10:04

## 2019-07-24 RX ADMIN — LISINOPRIL SCH MG: 20 TABLET ORAL at 10:04

## 2019-07-24 RX ADMIN — BUDESONIDE AND FORMOTEROL FUMARATE DIHYDRATE SCH PUFF: 160; 4.5 AEROSOL RESPIRATORY (INHALATION) at 10:06

## 2019-07-24 RX ADMIN — Medication SCH MG: at 21:19

## 2019-07-24 RX ADMIN — ATORVASTATIN CALCIUM SCH MG: 20 TABLET, FILM COATED ORAL at 21:18

## 2019-07-24 RX ADMIN — BUDESONIDE AND FORMOTEROL FUMARATE DIHYDRATE SCH: 160; 4.5 AEROSOL RESPIRATORY (INHALATION) at 21:20

## 2019-07-24 RX ADMIN — NICOTINE SCH: 7 PATCH TRANSDERMAL at 10:04

## 2019-07-24 RX ADMIN — Medication PRN MG: at 21:19

## 2019-07-25 RX ADMIN — BUDESONIDE AND FORMOTEROL FUMARATE DIHYDRATE SCH PUFF: 160; 4.5 AEROSOL RESPIRATORY (INHALATION) at 10:18

## 2019-07-25 RX ADMIN — Medication PRN MG: at 21:29

## 2019-07-25 RX ADMIN — LISINOPRIL SCH MG: 20 TABLET ORAL at 10:17

## 2019-07-25 RX ADMIN — NICOTINE SCH: 7 PATCH TRANSDERMAL at 10:18

## 2019-07-25 RX ADMIN — Medication SCH MG: at 21:29

## 2019-07-25 RX ADMIN — ESCITALOPRAM SCH MG: 20 TABLET, FILM COATED ORAL at 10:18

## 2019-07-25 RX ADMIN — BUDESONIDE AND FORMOTEROL FUMARATE DIHYDRATE SCH: 160; 4.5 AEROSOL RESPIRATORY (INHALATION) at 21:30

## 2019-07-25 RX ADMIN — ASPIRIN SCH MG: 81 TABLET, COATED ORAL at 10:18

## 2019-07-25 RX ADMIN — LURASIDONE HYDROCHLORIDE SCH MG: 40 TABLET, FILM COATED ORAL at 17:56

## 2019-07-25 RX ADMIN — Medication SCH TAB: at 10:18

## 2019-07-25 RX ADMIN — PANTOPRAZOLE SODIUM SCH MG: 40 TABLET, DELAYED RELEASE ORAL at 10:18

## 2019-07-25 RX ADMIN — ATORVASTATIN CALCIUM SCH MG: 20 TABLET, FILM COATED ORAL at 21:29

## 2019-07-25 RX ADMIN — PRAZOSIN HYDROCHLORIDE SCH MG: 1 CAPSULE ORAL at 21:30

## 2019-07-26 RX ADMIN — PRAZOSIN HYDROCHLORIDE SCH MG: 1 CAPSULE ORAL at 21:54

## 2019-07-26 RX ADMIN — Medication SCH TAB: at 10:19

## 2019-07-26 RX ADMIN — LURASIDONE HYDROCHLORIDE SCH MG: 40 TABLET, FILM COATED ORAL at 18:00

## 2019-07-26 RX ADMIN — LISINOPRIL SCH MG: 20 TABLET ORAL at 10:19

## 2019-07-26 RX ADMIN — BUDESONIDE AND FORMOTEROL FUMARATE DIHYDRATE SCH: 160; 4.5 AEROSOL RESPIRATORY (INHALATION) at 21:56

## 2019-07-26 RX ADMIN — ATORVASTATIN CALCIUM SCH MG: 20 TABLET, FILM COATED ORAL at 21:54

## 2019-07-26 RX ADMIN — SUVOREXANT PRN MG: 10 TABLET, FILM COATED ORAL at 21:56

## 2019-07-26 RX ADMIN — PANTOPRAZOLE SODIUM SCH MG: 40 TABLET, DELAYED RELEASE ORAL at 10:19

## 2019-07-26 RX ADMIN — ESCITALOPRAM SCH MG: 20 TABLET, FILM COATED ORAL at 10:19

## 2019-07-26 RX ADMIN — ASPIRIN SCH MG: 81 TABLET, COATED ORAL at 10:19

## 2019-07-26 RX ADMIN — Medication PRN MG: at 21:54

## 2019-07-26 RX ADMIN — NICOTINE SCH: 7 PATCH TRANSDERMAL at 10:20

## 2019-07-26 RX ADMIN — Medication SCH MG: at 21:54

## 2019-07-26 RX ADMIN — BUDESONIDE AND FORMOTEROL FUMARATE DIHYDRATE SCH PUFF: 160; 4.5 AEROSOL RESPIRATORY (INHALATION) at 10:19

## 2019-07-27 RX ADMIN — SUVOREXANT PRN MG: 10 TABLET, FILM COATED ORAL at 21:16

## 2019-07-27 RX ADMIN — ATORVASTATIN CALCIUM SCH MG: 20 TABLET, FILM COATED ORAL at 21:15

## 2019-07-27 RX ADMIN — PRAZOSIN HYDROCHLORIDE SCH MG: 1 CAPSULE ORAL at 21:15

## 2019-07-27 RX ADMIN — BUDESONIDE AND FORMOTEROL FUMARATE DIHYDRATE SCH PUFF: 160; 4.5 AEROSOL RESPIRATORY (INHALATION) at 10:01

## 2019-07-27 RX ADMIN — NICOTINE SCH: 7 PATCH TRANSDERMAL at 10:02

## 2019-07-27 RX ADMIN — ESCITALOPRAM SCH MG: 20 TABLET, FILM COATED ORAL at 10:01

## 2019-07-27 RX ADMIN — LISINOPRIL SCH MG: 20 TABLET ORAL at 10:02

## 2019-07-27 RX ADMIN — Medication SCH MG: at 21:15

## 2019-07-27 RX ADMIN — PANTOPRAZOLE SODIUM SCH MG: 40 TABLET, DELAYED RELEASE ORAL at 10:02

## 2019-07-27 RX ADMIN — Medication SCH TAB: at 10:01

## 2019-07-27 RX ADMIN — ASPIRIN SCH MG: 81 TABLET, COATED ORAL at 10:01

## 2019-07-27 RX ADMIN — LURASIDONE HYDROCHLORIDE SCH MG: 40 TABLET, FILM COATED ORAL at 18:01

## 2019-07-27 RX ADMIN — BUDESONIDE AND FORMOTEROL FUMARATE DIHYDRATE SCH: 160; 4.5 AEROSOL RESPIRATORY (INHALATION) at 21:28

## 2019-07-28 RX ADMIN — BUDESONIDE AND FORMOTEROL FUMARATE DIHYDRATE SCH: 160; 4.5 AEROSOL RESPIRATORY (INHALATION) at 21:58

## 2019-07-28 RX ADMIN — SUVOREXANT PRN MG: 10 TABLET, FILM COATED ORAL at 21:58

## 2019-07-28 RX ADMIN — Medication SCH TAB: at 10:19

## 2019-07-28 RX ADMIN — PANTOPRAZOLE SODIUM SCH MG: 40 TABLET, DELAYED RELEASE ORAL at 10:19

## 2019-07-28 RX ADMIN — LURASIDONE HYDROCHLORIDE SCH MG: 40 TABLET, FILM COATED ORAL at 17:39

## 2019-07-28 RX ADMIN — LISINOPRIL SCH MG: 20 TABLET ORAL at 10:19

## 2019-07-28 RX ADMIN — ASPIRIN SCH MG: 81 TABLET, COATED ORAL at 10:19

## 2019-07-28 RX ADMIN — NICOTINE SCH: 7 PATCH TRANSDERMAL at 10:21

## 2019-07-28 RX ADMIN — ATORVASTATIN CALCIUM SCH MG: 20 TABLET, FILM COATED ORAL at 21:55

## 2019-07-28 RX ADMIN — Medication PRN MG: at 21:55

## 2019-07-28 RX ADMIN — ESCITALOPRAM SCH MG: 20 TABLET, FILM COATED ORAL at 10:19

## 2019-07-28 RX ADMIN — PRAZOSIN HYDROCHLORIDE SCH MG: 1 CAPSULE ORAL at 21:55

## 2019-07-28 RX ADMIN — Medication SCH MG: at 21:55

## 2019-07-28 RX ADMIN — BUDESONIDE AND FORMOTEROL FUMARATE DIHYDRATE SCH PUFF: 160; 4.5 AEROSOL RESPIRATORY (INHALATION) at 10:19

## 2019-07-29 RX ADMIN — ESCITALOPRAM SCH MG: 20 TABLET, FILM COATED ORAL at 10:19

## 2019-07-29 RX ADMIN — BUDESONIDE AND FORMOTEROL FUMARATE DIHYDRATE SCH PUFF: 160; 4.5 AEROSOL RESPIRATORY (INHALATION) at 10:19

## 2019-07-29 RX ADMIN — BUDESONIDE AND FORMOTEROL FUMARATE DIHYDRATE SCH PUFF: 160; 4.5 AEROSOL RESPIRATORY (INHALATION) at 21:48

## 2019-07-29 RX ADMIN — ASPIRIN SCH MG: 81 TABLET, COATED ORAL at 10:19

## 2019-07-29 RX ADMIN — SUVOREXANT PRN MG: 10 TABLET, FILM COATED ORAL at 21:50

## 2019-07-29 RX ADMIN — PRAZOSIN HYDROCHLORIDE SCH MG: 1 CAPSULE ORAL at 21:48

## 2019-07-29 RX ADMIN — LISINOPRIL SCH MG: 20 TABLET ORAL at 10:19

## 2019-07-29 RX ADMIN — Medication SCH MG: at 21:48

## 2019-07-29 RX ADMIN — LURASIDONE HYDROCHLORIDE SCH MG: 40 TABLET, FILM COATED ORAL at 17:56

## 2019-07-29 RX ADMIN — Medication SCH TAB: at 10:19

## 2019-07-29 RX ADMIN — PANTOPRAZOLE SODIUM SCH MG: 40 TABLET, DELAYED RELEASE ORAL at 10:19

## 2019-07-29 RX ADMIN — NICOTINE SCH: 7 PATCH TRANSDERMAL at 10:19

## 2019-07-29 RX ADMIN — Medication PRN MG: at 21:50

## 2019-07-29 RX ADMIN — ATORVASTATIN CALCIUM SCH MG: 20 TABLET, FILM COATED ORAL at 21:48

## 2019-07-29 NOTE — PN
[facility] Progress Note


Note: 





Psychiatric nurse practitioner note:


Belsomra 10mg renewed X3 days. Verbal consent given.

## 2019-07-30 RX ADMIN — LURASIDONE HYDROCHLORIDE SCH MG: 40 TABLET, FILM COATED ORAL at 17:45

## 2019-07-30 RX ADMIN — Medication PRN MG: at 21:40

## 2019-07-30 RX ADMIN — SUVOREXANT PRN MG: 10 TABLET, FILM COATED ORAL at 21:41

## 2019-07-30 RX ADMIN — LISINOPRIL SCH MG: 20 TABLET ORAL at 10:27

## 2019-07-30 RX ADMIN — ATORVASTATIN CALCIUM SCH MG: 20 TABLET, FILM COATED ORAL at 21:39

## 2019-07-30 RX ADMIN — Medication SCH MG: at 21:39

## 2019-07-30 RX ADMIN — NICOTINE SCH: 7 PATCH TRANSDERMAL at 10:27

## 2019-07-30 RX ADMIN — BUDESONIDE AND FORMOTEROL FUMARATE DIHYDRATE SCH PUFF: 160; 4.5 AEROSOL RESPIRATORY (INHALATION) at 10:27

## 2019-07-30 RX ADMIN — ESCITALOPRAM SCH MG: 20 TABLET, FILM COATED ORAL at 10:27

## 2019-07-30 RX ADMIN — BUDESONIDE AND FORMOTEROL FUMARATE DIHYDRATE SCH: 160; 4.5 AEROSOL RESPIRATORY (INHALATION) at 21:41

## 2019-07-30 RX ADMIN — PANTOPRAZOLE SODIUM SCH MG: 40 TABLET, DELAYED RELEASE ORAL at 10:27

## 2019-07-30 RX ADMIN — PRAZOSIN HYDROCHLORIDE SCH MG: 1 CAPSULE ORAL at 21:39

## 2019-07-30 RX ADMIN — Medication SCH TAB: at 10:27

## 2019-07-30 RX ADMIN — ASPIRIN SCH MG: 81 TABLET, COATED ORAL at 10:27

## 2019-07-31 RX ADMIN — Medication SCH TAB: at 09:55

## 2019-07-31 RX ADMIN — LURASIDONE HYDROCHLORIDE SCH MG: 40 TABLET, FILM COATED ORAL at 17:35

## 2019-07-31 RX ADMIN — Medication SCH MG: at 21:38

## 2019-07-31 RX ADMIN — BUDESONIDE AND FORMOTEROL FUMARATE DIHYDRATE SCH PUFF: 160; 4.5 AEROSOL RESPIRATORY (INHALATION) at 09:55

## 2019-07-31 RX ADMIN — ATORVASTATIN CALCIUM SCH MG: 20 TABLET, FILM COATED ORAL at 21:37

## 2019-07-31 RX ADMIN — LISINOPRIL SCH MG: 20 TABLET ORAL at 09:56

## 2019-07-31 RX ADMIN — ASPIRIN SCH MG: 81 TABLET, COATED ORAL at 09:55

## 2019-07-31 RX ADMIN — PANTOPRAZOLE SODIUM SCH MG: 40 TABLET, DELAYED RELEASE ORAL at 09:55

## 2019-07-31 RX ADMIN — PRAZOSIN HYDROCHLORIDE SCH MG: 1 CAPSULE ORAL at 21:37

## 2019-07-31 RX ADMIN — BUDESONIDE AND FORMOTEROL FUMARATE DIHYDRATE SCH: 160; 4.5 AEROSOL RESPIRATORY (INHALATION) at 21:39

## 2019-07-31 RX ADMIN — Medication PRN MG: at 21:38

## 2019-07-31 RX ADMIN — ESCITALOPRAM SCH MG: 20 TABLET, FILM COATED ORAL at 09:55

## 2019-07-31 RX ADMIN — NICOTINE SCH: 7 PATCH TRANSDERMAL at 09:56

## 2019-07-31 RX ADMIN — SUVOREXANT PRN MG: 10 TABLET, FILM COATED ORAL at 21:39

## 2019-08-01 VITALS — TEMPERATURE: 97.7 F

## 2019-08-01 RX ADMIN — NICOTINE SCH: 7 PATCH TRANSDERMAL at 10:39

## 2019-08-01 RX ADMIN — Medication PRN MG: at 21:32

## 2019-08-01 RX ADMIN — ATORVASTATIN CALCIUM SCH MG: 20 TABLET, FILM COATED ORAL at 21:30

## 2019-08-01 RX ADMIN — BUDESONIDE AND FORMOTEROL FUMARATE DIHYDRATE SCH PUFF: 160; 4.5 AEROSOL RESPIRATORY (INHALATION) at 10:39

## 2019-08-01 RX ADMIN — LURASIDONE HYDROCHLORIDE SCH MG: 40 TABLET, FILM COATED ORAL at 18:49

## 2019-08-01 RX ADMIN — LISINOPRIL SCH MG: 20 TABLET ORAL at 10:38

## 2019-08-01 RX ADMIN — Medication SCH MG: at 21:30

## 2019-08-01 RX ADMIN — PRAZOSIN HYDROCHLORIDE SCH MG: 1 CAPSULE ORAL at 21:30

## 2019-08-01 RX ADMIN — Medication SCH TAB: at 10:39

## 2019-08-01 RX ADMIN — PANTOPRAZOLE SODIUM SCH MG: 40 TABLET, DELAYED RELEASE ORAL at 10:38

## 2019-08-01 RX ADMIN — ESCITALOPRAM SCH MG: 20 TABLET, FILM COATED ORAL at 10:38

## 2019-08-01 RX ADMIN — BUDESONIDE AND FORMOTEROL FUMARATE DIHYDRATE SCH: 160; 4.5 AEROSOL RESPIRATORY (INHALATION) at 21:30

## 2019-08-01 RX ADMIN — ASPIRIN SCH MG: 81 TABLET, COATED ORAL at 10:38

## 2019-08-02 VITALS — HEART RATE: 61 BPM | SYSTOLIC BLOOD PRESSURE: 122 MMHG | DIASTOLIC BLOOD PRESSURE: 68 MMHG

## 2019-08-02 RX ADMIN — NICOTINE SCH: 7 PATCH TRANSDERMAL at 09:22

## 2019-08-02 RX ADMIN — ESCITALOPRAM SCH MG: 20 TABLET, FILM COATED ORAL at 09:22

## 2019-08-02 RX ADMIN — LISINOPRIL SCH MG: 20 TABLET ORAL at 09:22

## 2019-08-02 RX ADMIN — BUDESONIDE AND FORMOTEROL FUMARATE DIHYDRATE SCH PUFF: 160; 4.5 AEROSOL RESPIRATORY (INHALATION) at 09:21

## 2019-08-02 RX ADMIN — PANTOPRAZOLE SODIUM SCH MG: 40 TABLET, DELAYED RELEASE ORAL at 09:22

## 2019-08-02 RX ADMIN — ASPIRIN SCH MG: 81 TABLET, COATED ORAL at 09:22

## 2019-08-02 RX ADMIN — Medication SCH TAB: at 09:22

## 2019-08-02 NOTE — PN
BHS Progress Note (SOAP)


Subjective: 





Pt is a 58 y/o male admitted to rehab on 7/20/19 after completing detox  at 

[facility] for juani. Pt completed rehab  and has been referred to  

aftercare for follow up. Pt reports he has own primary care privider at [facility] 

and has own meds with him. pt denies s/h/i.


Objective: 





08/02/19 11:45


Heent:Normocephalic, perrla,eomi,throat wnl





Neck:supple





Heart:S1 S2, rrr





Lungs:cta,yuri.





Abdomen:+bs,soft,nt,nd





ext: No e/c/c





Neuro;Alert o x 3; Ambulates with steady gait.














 Vital Signs - 24 hr











  08/01/19 08/02/19 08/02/19





  21:32 00:30 03:30


 


Temperature   


 


Pulse Rate 65  


 


Respiratory  18 17





Rate   


 


Blood Pressure 131/69  














  08/02/19





  06:52


 


Temperature 97.7 F


 


Pulse Rate 61


 


Respiratory 18





Rate 


 


Blood Pressure 122/68








 Home Medications











 Medication  Instructions  Recorded


 


Fluticasone/Salmeterol [Advair 1 puff IH BID 09/14/16





500-50 Diskus]  


 


Albuterol Sulfate Inhaler - 2 inh IH TID PRN 07/16/19





[Ventolin HFA Inhaler -]  


 


Aspirin [Ecotrin] 81 mg PO DAILY 07/16/19


 


Escitalopram Oxalate [Lexapro -] 20 mg PO DAILY 07/16/19


 


Folic Acid - 1 mg PO DAILY 07/16/19


 


Lisinopril [Prinivil] 40 mg PO DAILY 07/16/19


 


Lurasidone HCl [Latuda] 40 mg PO DAILY 07/16/19


 


Multivitamins [Multivit ([facility] 1 tab PO DAILY 07/16/19





Formulary)]  


 


Prazosin HCl 1 mg PO HS 07/16/19


 


Ranitidine [Zantac -] 150 mg PO DAILY 07/16/19


 


Simvastatin [Zocor -] 40 mg PO HS 07/16/19


 


Thiamine HCl [Vitamin B1 -] 100 mg PO HS 07/16/19


 


Zolpidem Tartrate 10 mg PO HS 07/16/19


 


Omeprazole Magnesium 40 mg PO 07/18/19


 


Escitalopram Oxalate [Lexapro -] 20 mg PO DAILY #30 tablet 08/02/19


 


Lurasidone HCl [Latuda -] 40 mg PO HS #30 tablet 08/02/19


 


Prazosin HCl [Minipress -] 1 mg PO HS #30 capsule 08/02/19

















Assessment: 





08/02/19 11:45


Nad


Medically stable











[facility] Inpatient Services Medical





- Diagnosis


(1) Cannabis abuse


Current Visit: Yes   Status: Chronic   





(2) Alcohol dependence


Qualifiers: 


   Substance use status: uncomplicated   Qualified Code(s): F10.20 - Alcohol 

dependence, uncomplicated   


Current Visit: Yes   Status: Chronic   





(3) Asthma


Qualifiers: 


   Asthma severity: unspecified severity   Asthma persistence: unspecified   

Asthma complication type: unspecified   Qualified Code(s): J45.909 - 

Unspecified asthma, uncomplicated   


Current Visit: Yes   Status: Chronic   





(4) Cocaine dependence


Qualifiers: 


   Substance use status: uncomplicated 


Current Visit: Yes   Status: Chronic   





(5) HTN (hypertension)


Qualifiers: 


   Hypertension type: essential hypertension 


Current Visit: Yes   Status: Chronic   





(6) Hypercholesterolemia


Current Visit: Yes   Status: Chronic   





(7) Nicotine dependence


Qualifiers: 


   Nicotine product type: cigarettes   Substance use status: uncomplicated   

Qualified Code(s): F17.210 - Nicotine dependence, cigarettes, uncomplicated   


Current Visit: Yes   Status: Chronic   





 ** Electronically signed by Bindu Gregory NP on 08/02/19 11:50 **


Initialized on 08/02/19 11:30 - END OF NOTE








Plan: 


D/c pt today.


Pt is discharged to [facility] on [address] [place], [place].


Follow up with CD Aftercare at [facility].

## 2019-08-02 NOTE — PN
BHS Progress Note


Note: 





Psychiatric nurse practitioner note:


Patient scheduled for discharge today. A 30 day prescription of Lexapro 20mg + 

Latuda 40mg + Minipress 1mg was electronically sent to [facility] @ [address] [place], [place] [id number].

## 2019-11-20 ENCOUNTER — HOSPITAL ENCOUNTER (INPATIENT)
Dept: HOSPITAL 74 - YASAS | Age: 59
LOS: 13 days | Discharge: HOME | DRG: 895 | End: 2019-12-03
Attending: NEUROMUSCULOSKELETAL MEDICINE & OMM | Admitting: NEUROMUSCULOSKELETAL MEDICINE & OMM
Payer: COMMERCIAL

## 2019-11-20 VITALS — BODY MASS INDEX: 36 KG/M2

## 2019-11-20 DIAGNOSIS — Z91.018: ICD-10-CM

## 2019-11-20 DIAGNOSIS — F12.20: ICD-10-CM

## 2019-11-20 DIAGNOSIS — F10.20: Primary | ICD-10-CM

## 2019-11-20 DIAGNOSIS — F14.20: ICD-10-CM

## 2019-11-20 DIAGNOSIS — Z91.14: ICD-10-CM

## 2019-11-20 DIAGNOSIS — Z91.5: ICD-10-CM

## 2019-11-20 DIAGNOSIS — F25.9: ICD-10-CM

## 2019-11-20 DIAGNOSIS — F31.9: ICD-10-CM

## 2019-11-20 DIAGNOSIS — F43.10: ICD-10-CM

## 2019-11-20 DIAGNOSIS — E78.00: ICD-10-CM

## 2019-11-20 DIAGNOSIS — K21.9: ICD-10-CM

## 2019-11-20 DIAGNOSIS — I10: ICD-10-CM

## 2019-11-20 DIAGNOSIS — F17.210: ICD-10-CM

## 2019-11-20 DIAGNOSIS — J45.909: ICD-10-CM

## 2019-11-20 PROCEDURE — HZ42ZZZ GROUP COUNSELING FOR SUBSTANCE ABUSE TREATMENT, COGNITIVE-BEHAVIORAL: ICD-10-PCS | Performed by: ALLERGY & IMMUNOLOGY

## 2019-11-20 RX ADMIN — Medication SCH MG: at 23:44

## 2019-11-20 RX ADMIN — HYDROXYZINE PAMOATE PRN MG: 50 CAPSULE ORAL at 23:45

## 2019-11-20 RX ADMIN — Medication PRN MG: at 23:44
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
## 2019-11-21 LAB
ALBUMIN SERPL-MCNC: 3.3 G/DL (ref 3.4–5)
ALP SERPL-CCNC: 82 U/L (ref 45–117)
ALT SERPL-CCNC: 51 U/L (ref 13–61)
ANION GAP SERPL CALC-SCNC: 4 MMOL/L (ref 8–16)
APPEARANCE UR: CLEAR
AST SERPL-CCNC: 32 U/L (ref 15–37)
BILIRUB SERPL-MCNC: 0.2 MG/DL (ref 0.2–1)
BILIRUB UR STRIP.AUTO-MCNC: NEGATIVE MG/DL
BUN SERPL-MCNC: 20.7 MG/DL (ref 7–18)
CALCIUM SERPL-MCNC: 8.7 MG/DL (ref 8.5–10.1)
CHLORIDE SERPL-SCNC: 104 MMOL/L (ref 98–107)
CO2 SERPL-SCNC: 31 MMOL/L (ref 21–32)
COLOR UR: YELLOW
CREAT SERPL-MCNC: 1.5 MG/DL (ref 0.55–1.3)
DEPRECATED RDW RBC AUTO: 14.1 % (ref 11.9–15.9)
GLUCOSE SERPL-MCNC: 134 MG/DL (ref 74–106)
HCT VFR BLD CALC: 36.7 % (ref 35.4–49)
HGB BLD-MCNC: 12.2 GM/DL (ref 11.7–16.9)
KETONES UR QL STRIP: (no result)
LEUKOCYTE ESTERASE UR QL STRIP.AUTO: NEGATIVE
MCH RBC QN AUTO: 28.9 PG (ref 25.7–33.7)
MCHC RBC AUTO-ENTMCNC: 33.1 G/DL (ref 32–35.9)
MCV RBC: 87.2 FL (ref 80–96)
NITRITE UR QL STRIP: NEGATIVE
PH UR: 5.5 [PH] (ref 5–8)
PLATELET # BLD AUTO: 277 K/MM3 (ref 134–434)
PMV BLD: 8.3 FL (ref 7.5–11.1)
POTASSIUM SERPLBLD-SCNC: 3.9 MMOL/L (ref 3.5–5.1)
PROT SERPL-MCNC: 6.3 G/DL (ref 6.4–8.2)
PROT UR QL STRIP: NEGATIVE
PROT UR QL STRIP: NEGATIVE
RBC # BLD AUTO: 4.21 M/MM3 (ref 4–5.6)
SODIUM SERPL-SCNC: 139 MMOL/L (ref 136–145)
SP GR UR: 1.03 (ref 1.01–1.03)
UROBILINOGEN UR STRIP-MCNC: 0.2 MG/DL (ref 0.2–1)
WBC # BLD AUTO: 9.5 K/MM3 (ref 4–10)

## 2019-11-21 RX ADMIN — Medication SCH TAB: at 10:20

## 2019-11-21 RX ADMIN — ESCITALOPRAM SCH MG: 20 TABLET, FILM COATED ORAL at 12:05

## 2019-11-21 RX ADMIN — Medication PRN MG: at 22:06

## 2019-11-21 RX ADMIN — Medication SCH MG: at 22:04

## 2019-11-21 RX ADMIN — HYDROXYZINE PAMOATE PRN MG: 50 CAPSULE ORAL at 10:21

## 2019-11-21 RX ADMIN — LURASIDONE HYDROCHLORIDE SCH MG: 40 TABLET, FILM COATED ORAL at 18:30

## 2019-11-21 RX ADMIN — PRAZOSIN HYDROCHLORIDE SCH MG: 1 CAPSULE ORAL at 22:04

## 2019-11-21 RX ADMIN — NICOTINE SCH: 21 PATCH TRANSDERMAL at 10:21

## 2019-11-21 RX ADMIN — ASPIRIN SCH MG: 81 TABLET, COATED ORAL at 10:20
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
## 2019-11-22 RX ADMIN — ASPIRIN SCH MG: 81 TABLET, COATED ORAL at 10:17

## 2019-11-22 RX ADMIN — SUVOREXANT PRN MG: 10 TABLET, FILM COATED ORAL at 21:36

## 2019-11-22 RX ADMIN — ESCITALOPRAM SCH MG: 20 TABLET, FILM COATED ORAL at 10:16

## 2019-11-22 RX ADMIN — PRAZOSIN HYDROCHLORIDE SCH MG: 1 CAPSULE ORAL at 21:34

## 2019-11-22 RX ADMIN — Medication SCH MG: at 21:34

## 2019-11-22 RX ADMIN — NICOTINE SCH MG: 21 PATCH TRANSDERMAL at 10:16

## 2019-11-22 RX ADMIN — LURASIDONE HYDROCHLORIDE SCH MG: 40 TABLET, FILM COATED ORAL at 17:01

## 2019-11-22 RX ADMIN — Medication SCH TAB: at 10:17

## 2019-11-22 RX ADMIN — Medication PRN MG: at 21:34

## 2019-11-22 RX ADMIN — PANTOPRAZOLE SODIUM SCH MG: 20 TABLET, DELAYED RELEASE ORAL at 12:21

## 2019-11-23 RX ADMIN — PRAZOSIN HYDROCHLORIDE SCH MG: 1 CAPSULE ORAL at 21:17

## 2019-11-23 RX ADMIN — NICOTINE SCH MG: 21 PATCH TRANSDERMAL at 10:11

## 2019-11-23 RX ADMIN — PANTOPRAZOLE SODIUM SCH MG: 20 TABLET, DELAYED RELEASE ORAL at 10:10

## 2019-11-23 RX ADMIN — NICOTINE POLACRILEX PRN MG: 2 GUM, CHEWING ORAL at 10:11

## 2019-11-23 RX ADMIN — LURASIDONE HYDROCHLORIDE SCH MG: 40 TABLET, FILM COATED ORAL at 18:42

## 2019-11-23 RX ADMIN — Medication SCH TAB: at 10:10

## 2019-11-23 RX ADMIN — ESCITALOPRAM SCH MG: 20 TABLET, FILM COATED ORAL at 10:11

## 2019-11-23 RX ADMIN — HYDROXYZINE PAMOATE PRN MG: 50 CAPSULE ORAL at 10:10

## 2019-11-23 RX ADMIN — Medication SCH MG: at 21:17

## 2019-11-23 RX ADMIN — SUVOREXANT PRN MG: 10 TABLET, FILM COATED ORAL at 21:17

## 2019-11-23 RX ADMIN — ASPIRIN SCH MG: 81 TABLET, COATED ORAL at 10:10

## 2019-11-24 RX ADMIN — SUVOREXANT PRN MG: 10 TABLET, FILM COATED ORAL at 21:34

## 2019-11-24 RX ADMIN — PANTOPRAZOLE SODIUM SCH MG: 20 TABLET, DELAYED RELEASE ORAL at 10:22

## 2019-11-24 RX ADMIN — NICOTINE SCH MG: 21 PATCH TRANSDERMAL at 10:22

## 2019-11-24 RX ADMIN — HYDROXYZINE PAMOATE PRN MG: 50 CAPSULE ORAL at 21:33

## 2019-11-24 RX ADMIN — Medication SCH TAB: at 10:22

## 2019-11-24 RX ADMIN — LURASIDONE HYDROCHLORIDE SCH MG: 40 TABLET, FILM COATED ORAL at 17:03

## 2019-11-24 RX ADMIN — PRAZOSIN HYDROCHLORIDE SCH MG: 1 CAPSULE ORAL at 21:34

## 2019-11-24 RX ADMIN — HYDROXYZINE PAMOATE PRN MG: 50 CAPSULE ORAL at 10:22

## 2019-11-24 RX ADMIN — Medication SCH MG: at 21:33

## 2019-11-24 RX ADMIN — ESCITALOPRAM SCH MG: 20 TABLET, FILM COATED ORAL at 10:22

## 2019-11-24 RX ADMIN — ASPIRIN SCH MG: 81 TABLET, COATED ORAL at 10:22

## 2019-11-25 RX ADMIN — Medication PRN MG: at 21:25

## 2019-11-25 RX ADMIN — NICOTINE SCH MG: 21 PATCH TRANSDERMAL at 11:13

## 2019-11-25 RX ADMIN — ESCITALOPRAM SCH MG: 20 TABLET, FILM COATED ORAL at 15:05

## 2019-11-25 RX ADMIN — PRAZOSIN HYDROCHLORIDE SCH MG: 1 CAPSULE ORAL at 21:25

## 2019-11-25 RX ADMIN — PANTOPRAZOLE SODIUM SCH MG: 40 TABLET, DELAYED RELEASE ORAL at 11:15

## 2019-11-25 RX ADMIN — Medication SCH TAB: at 11:11

## 2019-11-25 RX ADMIN — HYDROXYZINE PAMOATE PRN MG: 50 CAPSULE ORAL at 21:25

## 2019-11-25 RX ADMIN — Medication SCH MG: at 21:25

## 2019-11-25 RX ADMIN — LURASIDONE HYDROCHLORIDE SCH MG: 40 TABLET, FILM COATED ORAL at 17:00

## 2019-11-25 RX ADMIN — ASPIRIN SCH MG: 81 TABLET, COATED ORAL at 11:10

## 2019-11-26 LAB
ALBUMIN SERPL-MCNC: 3.4 G/DL (ref 3.4–5)
ALP SERPL-CCNC: 57 U/L (ref 45–117)
ALT SERPL-CCNC: 48 U/L (ref 13–61)
ANION GAP SERPL CALC-SCNC: 5 MMOL/L (ref 8–16)
AST SERPL-CCNC: 21 U/L (ref 15–37)
BILIRUB SERPL-MCNC: 0.2 MG/DL (ref 0.2–1)
BUN SERPL-MCNC: 16.9 MG/DL (ref 7–18)
CALCIUM SERPL-MCNC: 8.9 MG/DL (ref 8.5–10.1)
CHLORIDE SERPL-SCNC: 104 MMOL/L (ref 98–107)
CO2 SERPL-SCNC: 30 MMOL/L (ref 21–32)
CREAT SERPL-MCNC: 1.2 MG/DL (ref 0.55–1.3)
GLUCOSE SERPL-MCNC: 124 MG/DL (ref 74–106)
POTASSIUM SERPLBLD-SCNC: 4.2 MMOL/L (ref 3.5–5.1)
PROT SERPL-MCNC: 6.3 G/DL (ref 6.4–8.2)
SODIUM SERPL-SCNC: 138 MMOL/L (ref 136–145)

## 2019-11-26 RX ADMIN — ESCITALOPRAM SCH MG: 20 TABLET, FILM COATED ORAL at 10:17

## 2019-11-26 RX ADMIN — HYDROXYZINE PAMOATE PRN MG: 50 CAPSULE ORAL at 21:46

## 2019-11-26 RX ADMIN — HYDROXYZINE PAMOATE PRN MG: 50 CAPSULE ORAL at 10:17

## 2019-11-26 RX ADMIN — NICOTINE SCH MG: 21 PATCH TRANSDERMAL at 10:16

## 2019-11-26 RX ADMIN — Medication PRN MG: at 21:46

## 2019-11-26 RX ADMIN — LURASIDONE HYDROCHLORIDE SCH MG: 40 TABLET, FILM COATED ORAL at 17:41

## 2019-11-26 RX ADMIN — Medication SCH MG: at 21:46

## 2019-11-26 RX ADMIN — PANTOPRAZOLE SODIUM SCH MG: 40 TABLET, DELAYED RELEASE ORAL at 10:16

## 2019-11-26 RX ADMIN — ASPIRIN SCH MG: 81 TABLET, COATED ORAL at 10:16

## 2019-11-26 RX ADMIN — Medication SCH TAB: at 10:16

## 2019-11-26 RX ADMIN — PRAZOSIN HYDROCHLORIDE SCH MG: 1 CAPSULE ORAL at 21:46

## 2019-11-27 RX ADMIN — NICOTINE SCH MG: 21 PATCH TRANSDERMAL at 10:35

## 2019-11-27 RX ADMIN — Medication SCH TAB: at 10:35

## 2019-11-27 RX ADMIN — ASPIRIN SCH MG: 81 TABLET, COATED ORAL at 10:35

## 2019-11-27 RX ADMIN — LURASIDONE HYDROCHLORIDE SCH MG: 40 TABLET, FILM COATED ORAL at 17:03

## 2019-11-27 RX ADMIN — ESCITALOPRAM SCH MG: 20 TABLET, FILM COATED ORAL at 10:35

## 2019-11-27 RX ADMIN — Medication SCH MG: at 21:12

## 2019-11-27 RX ADMIN — PRAZOSIN HYDROCHLORIDE SCH MG: 1 CAPSULE ORAL at 21:12

## 2019-11-27 RX ADMIN — PANTOPRAZOLE SODIUM SCH MG: 40 TABLET, DELAYED RELEASE ORAL at 10:35

## 2019-11-27 RX ADMIN — HYDROXYZINE PAMOATE PRN MG: 50 CAPSULE ORAL at 21:12

## 2019-11-27 RX ADMIN — Medication PRN MG: at 21:12

## 2019-11-28 RX ADMIN — HYDROXYZINE PAMOATE PRN MG: 50 CAPSULE ORAL at 21:43

## 2019-11-28 RX ADMIN — ASPIRIN SCH MG: 81 TABLET, COATED ORAL at 10:02

## 2019-11-28 RX ADMIN — Medication SCH TAB: at 10:02

## 2019-11-28 RX ADMIN — NICOTINE SCH MG: 21 PATCH TRANSDERMAL at 10:03

## 2019-11-28 RX ADMIN — Medication SCH MG: at 21:43

## 2019-11-28 RX ADMIN — Medication PRN MG: at 21:43

## 2019-11-28 RX ADMIN — PRAZOSIN HYDROCHLORIDE SCH MG: 1 CAPSULE ORAL at 21:43

## 2019-11-28 RX ADMIN — ESCITALOPRAM SCH MG: 20 TABLET, FILM COATED ORAL at 10:02

## 2019-11-28 RX ADMIN — LURASIDONE HYDROCHLORIDE SCH MG: 40 TABLET, FILM COATED ORAL at 17:02

## 2019-11-28 RX ADMIN — PANTOPRAZOLE SODIUM SCH MG: 40 TABLET, DELAYED RELEASE ORAL at 10:02

## 2019-11-29 RX ADMIN — NICOTINE POLACRILEX PRN MG: 2 GUM, CHEWING ORAL at 10:06

## 2019-11-29 RX ADMIN — ASPIRIN SCH MG: 81 TABLET, COATED ORAL at 10:05

## 2019-11-29 RX ADMIN — HYDROXYZINE PAMOATE PRN MG: 50 CAPSULE ORAL at 21:54

## 2019-11-29 RX ADMIN — PANTOPRAZOLE SODIUM SCH MG: 40 TABLET, DELAYED RELEASE ORAL at 10:04

## 2019-11-29 RX ADMIN — Medication SCH MG: at 21:53

## 2019-11-29 RX ADMIN — ESCITALOPRAM SCH MG: 20 TABLET, FILM COATED ORAL at 10:05

## 2019-11-29 RX ADMIN — HYDROXYZINE PAMOATE PRN MG: 50 CAPSULE ORAL at 10:04

## 2019-11-29 RX ADMIN — Medication SCH TAB: at 10:04

## 2019-11-29 RX ADMIN — Medication PRN MG: at 21:55

## 2019-11-29 RX ADMIN — PRAZOSIN HYDROCHLORIDE SCH MG: 1 CAPSULE ORAL at 21:53

## 2019-11-29 RX ADMIN — LURASIDONE HYDROCHLORIDE SCH MG: 40 TABLET, FILM COATED ORAL at 19:55

## 2019-11-29 RX ADMIN — NICOTINE SCH MG: 21 PATCH TRANSDERMAL at 10:05

## 2019-11-30 RX ADMIN — ASPIRIN SCH MG: 81 TABLET, COATED ORAL at 09:15

## 2019-11-30 RX ADMIN — Medication PRN MG: at 21:29

## 2019-11-30 RX ADMIN — LURASIDONE HYDROCHLORIDE SCH MG: 40 TABLET, FILM COATED ORAL at 17:00

## 2019-11-30 RX ADMIN — Medication SCH MG: at 21:29

## 2019-11-30 RX ADMIN — Medication SCH TAB: at 09:16

## 2019-11-30 RX ADMIN — NICOTINE SCH: 21 PATCH TRANSDERMAL at 09:15

## 2019-11-30 RX ADMIN — HYDROXYZINE PAMOATE PRN MG: 50 CAPSULE ORAL at 21:30

## 2019-11-30 RX ADMIN — ESCITALOPRAM SCH MG: 20 TABLET, FILM COATED ORAL at 09:15

## 2019-11-30 RX ADMIN — PANTOPRAZOLE SODIUM SCH MG: 40 TABLET, DELAYED RELEASE ORAL at 09:16

## 2019-11-30 RX ADMIN — PRAZOSIN HYDROCHLORIDE SCH MG: 1 CAPSULE ORAL at 21:29

## 2019-12-01 RX ADMIN — Medication PRN MG: at 21:13

## 2019-12-01 RX ADMIN — PANTOPRAZOLE SODIUM SCH MG: 40 TABLET, DELAYED RELEASE ORAL at 09:12

## 2019-12-01 RX ADMIN — HYDROXYZINE PAMOATE PRN MG: 50 CAPSULE ORAL at 21:13

## 2019-12-01 RX ADMIN — ASPIRIN SCH MG: 81 TABLET, COATED ORAL at 09:12

## 2019-12-01 RX ADMIN — NICOTINE SCH: 21 PATCH TRANSDERMAL at 09:12

## 2019-12-01 RX ADMIN — PRAZOSIN HYDROCHLORIDE SCH MG: 1 CAPSULE ORAL at 21:13

## 2019-12-01 RX ADMIN — ESCITALOPRAM SCH MG: 20 TABLET, FILM COATED ORAL at 09:12

## 2019-12-01 RX ADMIN — LURASIDONE HYDROCHLORIDE SCH MG: 40 TABLET, FILM COATED ORAL at 17:00

## 2019-12-01 RX ADMIN — Medication SCH MG: at 21:13

## 2019-12-01 RX ADMIN — Medication SCH TAB: at 09:12

## 2019-12-02 RX ADMIN — ESCITALOPRAM SCH MG: 20 TABLET, FILM COATED ORAL at 10:32

## 2019-12-02 RX ADMIN — PRAZOSIN HYDROCHLORIDE SCH MG: 1 CAPSULE ORAL at 21:14

## 2019-12-02 RX ADMIN — Medication SCH TAB: at 10:30

## 2019-12-02 RX ADMIN — Medication SCH MG: at 21:14

## 2019-12-02 RX ADMIN — PANTOPRAZOLE SODIUM SCH MG: 40 TABLET, DELAYED RELEASE ORAL at 10:34

## 2019-12-02 RX ADMIN — NICOTINE SCH MG: 21 PATCH TRANSDERMAL at 10:31

## 2019-12-02 RX ADMIN — Medication PRN MG: at 21:14

## 2019-12-02 RX ADMIN — HYDROXYZINE PAMOATE PRN MG: 50 CAPSULE ORAL at 21:14

## 2019-12-02 RX ADMIN — LURASIDONE HYDROCHLORIDE SCH MG: 40 TABLET, FILM COATED ORAL at 17:00

## 2019-12-02 RX ADMIN — ASPIRIN SCH MG: 81 TABLET, COATED ORAL at 10:32

## 2019-12-03 VITALS — SYSTOLIC BLOOD PRESSURE: 152 MMHG | TEMPERATURE: 97.3 F | HEART RATE: 72 BPM | DIASTOLIC BLOOD PRESSURE: 82 MMHG

## 2019-12-03 RX ADMIN — ASPIRIN SCH MG: 81 TABLET, COATED ORAL at 09:21

## 2019-12-03 RX ADMIN — ESCITALOPRAM SCH MG: 20 TABLET, FILM COATED ORAL at 09:21

## 2019-12-03 RX ADMIN — Medication SCH TAB: at 09:21

## 2019-12-03 RX ADMIN — HYDROXYZINE PAMOATE PRN MG: 50 CAPSULE ORAL at 00:49

## 2019-12-03 RX ADMIN — PANTOPRAZOLE SODIUM SCH MG: 40 TABLET, DELAYED RELEASE ORAL at 09:21

## 2019-12-03 RX ADMIN — NICOTINE SCH MG: 21 PATCH TRANSDERMAL at 09:21

## 2019-12-03 NOTE — PN
[facility] Progress Note


Note: 





Patient is scheduled for discharge today. Scripts for 30 days supply of 

medications(Lexapro 20 mg/day, Latuda 40 mg/hs, Prazosin 1 mg/hs) are 

electronically transmitted to [facility] at [address] [place] [address] 

[id number]

## 2019-12-03 NOTE — DS
[facility] Rehab Discharge Summary





- [facility] Rehab Discharge Summary


Admission Date: 11/20/19


Discharge Date: 12/03/19





- History


Pertinent Past History: 





Pt was admitted here for rehab from 11/20 after completing detox at [facility]. Pt 

states he did well in rehab. Pt states he will f/u with PCP on 12/11. He will 

go to [facility] and then going back to work, 12/22 as a .








PE


 Vital Signs - 24 hr











  12/02/19 12/03/19





  22:00 06:30


 


Temperature  97.3 F L


 


Pulse Rate 84 72


 


Respiratory  18





Rate  


 


Blood Pressure 159/82 152/82





lungs clear


heart RRR with systolic murmur


States he needs meds and will f/u PCP next week.


Asthma/HTN/high cholesterol: PMH








- Discharge Physical Exam


Vital Signs: 


 Vital Signs











Temperature  97.3 F L  12/03/19 06:30


 


Pulse Rate  72   12/03/19 06:30


 


Respiratory Rate  18   12/03/19 06:30


 


Blood Pressure  152/82   12/03/19 06:30


 


O2 Sat by Pulse Oximetry (%)      














- Treatment


Discharge Condition: Responded well





- Medication


Discharge Medications: 


Ambulatory Orders





Fluticasone/Salmeterol [Advair 500-50 Diskus] 1 puff IH BID 09/14/16 


Multivitamins [Multivit ([facility] Formulary)] 1 tab PO DAILY 07/16/19 


Zolpidem Tartrate 10 mg PO HS 07/16/19 


Omeprazole Magnesium 40 mg PO DAILY 07/18/19 


Hydroxyzine HCl 50 mg PO HS 11/20/19 


Albuterol Sulfate Inhaler - [Ventolin HFA Inhaler -] 2 inh IH TID PRN #1 

inhaler 12/03/19 


Aspirin [Ecotrin] 81 mg PO DAILY #30 tablet. 12/03/19 


Escitalopram Oxalate [Lexapro -] 20 mg PO DAILY #30 tablet 12/03/19 


Folic Acid - 1 mg PO DAILY #30 tablet 12/03/19 


Lisinopril [Prinivil] 40 mg PO DAILY #14 tablet 12/03/19 


Lurasidone HCl [Latuda -] 40 mg PO HS #30 tablet 12/03/19 


Pantoprazole Sodium [Protonix -] 40 mg PO DAILY #14 tablet.ec 12/03/19 


Prazosin HCl [Minipress -] 1 mg PO HS #30 capsule 12/03/19 


Prenatal Vitamins ([facility]) - 1 tab PO DAILY #30 tablet 12/03/19 


Simvastatin [Zocor -] 40 mg PO HS #14 tablet 12/03/19 


Thiamine HCl [Vitamin B1 -] 100 mg PO HS #30 tablet 12/03/19 











- Discharge Instructions


Diet, activity, other medical instructions: 





Diet:





Activity: 





Other medical instructions:








- AMA


Did Patient Leave Against Medical Advice: No

## 2020-01-17 NOTE — PN
[facility] Progress Note


Note: 





 Laboratory Tests











  11/21/19 11/21/19 11/21/19





  07:40 08:50 08:50


 


WBC   9.5 


 


RBC   4.21 


 


Hgb   12.2 


 


Hct   36.7 


 


MCV   87.2 


 


MCH   28.9 


 


MCHC   33.1 


 


RDW   14.1 


 


Plt Count   277 


 


MPV   8.3 


 


Sodium    139


 


Potassium    3.9


 


Chloride    104


 


Carbon Dioxide    31


 


Anion Gap    4 L


 


BUN    20.7 H


 


Creatinine    1.5 H


 


Est GFR (CKD-EPI)AfAm    59.05


 


Est GFR (CKD-EPI)NonAf    50.95


 


Random Glucose    134 H


 


Calcium    8.7


 


Total Bilirubin    0.2


 


AST    32


 


ALT    51


 


Alkaline Phosphatase    82


 


Total Protein    6.3 L


 


Albumin    3.3 L


 


Urine Color  Yellow  


 


Urine Appearance  Clear  


 


Urine pH  5.5  


 


Ur Specific Gravity  1.032  


 


Urine Protein  Negative  


 


Urine Glucose (UA)  Negative  


 


Urine Ketones  Trace H  


 


Urine Blood  Negative  


 


Urine Nitrite  Negative  


 


Urine Bilirubin  Negative  


 


Urine Urobilinogen  0.2  


 


Ur Leukocyte Esterase  Negative  


 


RPR Titer   














  11/21/19





  08:50


 


WBC 


 


RBC 


 


Hgb 


 


Hct 


 


MCV 


 


MCH 


 


MCHC 


 


RDW 


 


Plt Count 


 


MPV 


 


Sodium 


 


Potassium 


 


Chloride 


 


Carbon Dioxide 


 


Anion Gap 


 


BUN 


 


Creatinine 


 


Est GFR (CKD-EPI)AfAm 


 


Est GFR (CKD-EPI)NonAf 


 


Random Glucose 


 


Calcium 


 


Total Bilirubin 


 


AST 


 


ALT 


 


Alkaline Phosphatase 


 


Total Protein 


 


Albumin 


 


Urine Color 


 


Urine Appearance 


 


Urine pH 


 


Ur Specific Gravity 


 


Urine Protein 


 


Urine Glucose (UA) 


 


Urine Ketones 


 


Urine Blood 


 


Urine Nitrite 


 


Urine Bilirubin 


 


Urine Urobilinogen 


 


Ur Leukocyte Esterase 


 


RPR Titer  Nonreactive








 Vital Signs











Temperature  97.7 F   11/25/19 07:11


 


Pulse Rate  65   11/25/19 07:11


 


Respiratory Rate  18   11/25/19 07:11


 


Blood Pressure  143/75   11/25/19 07:11


 


O2 Sat by Pulse Oximetry (%)      








Labs appreciated, will repeat CMP in am. X Size Of Lesion In Cm (Optional): 0

## 2020-07-10 NOTE — PDOC
"Requested Prescriptions   Pending Prescriptions Disp Refills     losartan (COZAAR) 25 MG tablet [Pharmacy Med Name: LOSARTAN POTASSIUM 25 MG TAB] 90 tablet 0     Sig: TAKE 1 TABLET BY MOUTH EVERY DAY       Angiotensin-II Receptors Passed - 7/8/2020  9:11 AM        Passed - Last blood pressure under 140/90 in past 12 months     BP Readings from Last 3 Encounters:   02/10/20 130/80   11/22/19 122/76   11/14/19 130/84                 Passed - Recent (12 mo) or future (30 days) visit within the authorizing provider's specialty     Patient has had an office visit with the authorizing provider or a provider within the authorizing providers department within the previous 12 mos or has a future within next 30 days. See \"Patient Info\" tab in inbasket, or \"Choose Columns\" in Meds & Orders section of the refill encounter.              Passed - Medication is active on med list        Passed - Patient is age 18 or older        Passed - No active pregnancy on record        Passed - Normal serum creatinine on file in past 12 months     Recent Labs   Lab Test 02/10/20  1545   CR 0.82       Ok to refill medication if creatinine is low          Passed - Normal serum potassium on file in past 12 months     Recent Labs   Lab Test 02/10/20  1545   POTASSIUM 3.8                    Passed - No positive pregnancy test in past 12 months         LOV 2/10/20 Yenny  Prescription approved per [facility] Refill Protocol.  Brenda J. Goodell, RN on 7/10/2020 at 2:01 PM      " History of Present Illness





- General


Chief Complaint: Chest Pain


Stated Complaint: CHEST


Time Seen by Provider: 06/18/18 17:40





- History of Present Illness


Initial Comments: 





06/18/18 17:45


Mr. Eldridge is a 57 yo male w/ pmh of Asthma, NIDDM, HLD, HTN, GERD, ACS, 

appendectomy, PTSD, and Bipolar Disorder who presents for evaluation of 2-3 day 

history of intermittent chest pain. He reports he has had 2 MI's in the past (

one cocaine induced, one he was told to get a cardiac cath after and never 

followed up) and decided to come in from [facility] for evaluation after his 

chest pain started. He smokes 5 cigarettes per day. Sees NP Jade Kaur at 

[facility], currently on Seroquel 250 mg po hs, Risperdol 2 mg am and 3 

mg hs, Trazodone 100 mg po hs, Gabapentin 300 mg po bid.





The patient denies shortness of breath, headache and dizziness. Denies fever, 

chills, nausea, vomit, diarrhea and constipation. Denies dysuria, frequency, 

urgency and hematuria. 





Allergies: NKDA





Past History





- Past Medical History


Allergies/Adverse Reactions: 


 Allergies











Allergy/AdvReac Type Severity Reaction Status Date / Time


 


No Known Drug Allergies Allergy   Verified 06/18/18 17:43


 


strawberry [Strawberry] Allergy  Itching Verified 06/18/18 17:43











Home Medications: 


Ambulatory Orders





Simvastatin [Zocor -] 40 mg PO HS #30 tablet 04/04/14 


Quetiapine Fumarate [Seroquel -] 50 mg PO DAILY 02/12/15 


Risperidone [Risperdal -] 6 mg PO BID 02/12/15 


Albuterol Sulfate Inhaler - [Ventolin HFA Inhaler -] 2 inh IH Q4H PRN #1 

canister 02/16/15 


Thiamine HCl [Vitamin B1 -] 100 mg PO HS #30 tablet 02/16/15 


Aspirin [Ecotrin] 81 mg PO DAILY 04/08/16 


Lisinopril 20 mg PO DAILY 04/08/16 


Naproxen 500 mg PO DAILY 04/08/16 


Omeprazole [Prilosec] 40 mg PO DAILY 04/08/16 


Zolpidem Tartrate 10 mg PO HS 04/08/16 


Fluticasone/Salmeterol [Advair 500-50 Diskus] 1 puff IH BID 09/14/16 


Paroxetine HCl [Paxil -] 40 mg PO DAILY 09/14/16 


Folic Acid - 1 mg PO DAILY 06/15/18 


Gabapentin [Neurontin -] 300 mg PO TID 06/15/18 


Quetiapine Fumarate [Seroquel -] 200 mg PO HS 06/15/18 


Trazodone HCl 100 mg PO HS 06/15/18 








Anemia: No


Asthma: Yes (ON MEDICATION)


Cancer: No


Cardiac Disorders: No


CVA: No


COPD: No


CHF: No


Dementia: No


Diabetes: No


GI Disorders: No


 Disorders: No


HTN: Yes (ON MEDICATION)


Hypercholesterolemia: Yes (simvastatin 40 mg )


Kidney Stones: No


Liver Disease: No


Seizures: No


Thyroid Disease: No





- Surgical History


Abdominal Surgery: No


Appendectomy: Yes (1997)


Cardiac Surgery: No


Cholecystectomy: No


Lung Surgery: No


Neurologic Surgery: No


Orthopedic Surgery: No





- Reproductive History


Testicular Surgery: No





- Suicide/Smoking/Psychosocial Hx


Smoking History: Current every day smoker


Have you smoked in the past 12 months: Yes


Number of Cigarettes Smoked Daily: 5


'Breaking Loose' booklet given: 06/15/18


Hx Alcohol Use: Yes


Drug/Substance Use Hx: Yes


Substance Use Type: Alcohol, Cocaine


Hx Substance Use Treatment: Yes (at [facility]  detox/rehab.)





**Review of Systems





- Review of Systems


Comments:: 





06/18/18 17:50


GENERAL/CONSTITUTIONAL: No fever or chills. No weakness.


HEAD, EYES, EARS, NOSE AND THROAT: No change in vision. No ear pain or 

discharge. No sore throat.


CARDIOVASCULAR: +Intermittent chest pain w/out shortness of breath


RESPIRATORY: No cough, wheezing, or hemoptysis.


GASTROINTESTINAL: No nausea, vomiting, diarrhea or constipation.


GENITOURINARY: No dysuria, frequency, or change in urination.


MUSCULOSKELETAL: No joint or muscle swelling or pain. No neck or back pain.


SKIN: No rash


NEUROLOGIC: No headache, vertigo, loss of consciousness, or change in strength/

sensation.


ENDOCRINE: No increased thirst. No abnormal weight change


HEMATOLOGIC/LYMPHATIC: No anemia, easy bleeding, or history of blood clots.


ALLERGIC/IMMUNOLOGIC: No hives or skin allergy.





*Physical Exam





- Physical Exam


Comments: 





06/18/18 19:39


GENERAL: Awake, alert, and fully oriented, in no acute distress


HEAD: No signs of trauma, normocephalic, atraumatic 


EYES: PERRLA, EOMI, sclera anicteric, conjunctiva clear


ENT: Auricles normal inspection, hearing grossly normal, nares patent, 

oropharynx clear without


exudates. Moist mucosa


NECK: Normal ROM, supple, no lymphadenopathy, JVD, or masses


LUNGS: No distress, speaks full sentences, clear to auscultation bilaterally 


HEART: +Blowing S1 murmur.Regular rate/rhythm no rubs or gallops, peripheral 

pulses normal and equal bilaterally. 


ABDOMEN: Soft, nontender, normoactive bowel sounds. No guarding, no rebound. No 

masses


EXTREMITIES: Normal inspection, Normal range of motion, no edema. No clubbing 

or cyanosis. 


NEUROLOGICAL: Cranial nerves II through XII grossly intact. Normal speech, 

normal gait, no focal sensorimotor deficits 


SKIN: Warm, Dry, normal turgor, no rashes or lesions noted. 





ED Treatment Course





- LABORATORY


CBC & Chemistry Diagram: 


 06/18/18 17:49





 06/18/18 17:49





Medical Decision Making





- Medical Decision Making





06/18/18 19:35


Ms. Eldridge is a 57 yo male w/ pmh as described who presents for evaluation of 

intermittent chest pain as described. Cardiac workup started. EKG concerning 

for ST elevation in V3 and t-wave inversion in III. Inpatient team consulted 

and patient admitted to [facility] for r/o MI.





*DC/Admit/Observation/Transfer


Diagnosis at time of Disposition: 


Chest pain


Qualifiers:


 Chest pain type: unspecified Qualified Code(s): R07.9 - Chest pain, unspecified








- Discharge Dispostion


Decision to Admit order: Yes





- Referrals





- Patient Instructions





- Post Discharge Activity

## 2020-11-11 ENCOUNTER — HOSPITAL ENCOUNTER (INPATIENT)
Dept: HOSPITAL 74 - YASAS | Age: 60
LOS: 5 days | Discharge: OTHER INSTITUTION | DRG: 897 | End: 2020-11-16
Attending: ALLERGY & IMMUNOLOGY | Admitting: ALLERGY & IMMUNOLOGY
Payer: COMMERCIAL

## 2020-11-11 VITALS — BODY MASS INDEX: 34.4 KG/M2

## 2020-11-11 DIAGNOSIS — Z62.810: ICD-10-CM

## 2020-11-11 DIAGNOSIS — J45.909: ICD-10-CM

## 2020-11-11 DIAGNOSIS — F43.10: ICD-10-CM

## 2020-11-11 DIAGNOSIS — K21.9: ICD-10-CM

## 2020-11-11 DIAGNOSIS — F19.20: ICD-10-CM

## 2020-11-11 DIAGNOSIS — F10.230: Primary | ICD-10-CM

## 2020-11-11 DIAGNOSIS — F19.282: ICD-10-CM

## 2020-11-11 DIAGNOSIS — F17.210: ICD-10-CM

## 2020-11-11 DIAGNOSIS — Z91.018: ICD-10-CM

## 2020-11-11 DIAGNOSIS — E78.00: ICD-10-CM

## 2020-11-11 DIAGNOSIS — N17.9: ICD-10-CM

## 2020-11-11 DIAGNOSIS — I25.10: ICD-10-CM

## 2020-11-11 DIAGNOSIS — I10: ICD-10-CM

## 2020-11-11 DIAGNOSIS — F19.24: ICD-10-CM

## 2020-11-11 DIAGNOSIS — F14.20: ICD-10-CM

## 2020-11-11 DIAGNOSIS — F25.9: ICD-10-CM

## 2020-11-11 DIAGNOSIS — I35.0: ICD-10-CM

## 2020-11-11 DIAGNOSIS — R01.1: ICD-10-CM

## 2020-11-11 LAB
ALBUMIN SERPL-MCNC: 4.2 G/DL (ref 3.4–5)
ALP SERPL-CCNC: 53 U/L (ref 45–117)
ALT SERPL-CCNC: 25 U/L (ref 13–61)
ANION GAP SERPL CALC-SCNC: 9 MMOL/L (ref 8–16)
AST SERPL-CCNC: 18 U/L (ref 15–37)
BILIRUB SERPL-MCNC: 1.6 MG/DL (ref 0.2–1)
BUN SERPL-MCNC: 7.5 MG/DL (ref 7–18)
CALCIUM SERPL-MCNC: 10.2 MG/DL (ref 8.5–10.1)
CHLORIDE SERPL-SCNC: 101 MMOL/L (ref 98–107)
CO2 SERPL-SCNC: 28 MMOL/L (ref 21–32)
CREAT SERPL-MCNC: 1.2 MG/DL (ref 0.55–1.3)
DEPRECATED RDW RBC AUTO: 14.6 % (ref 11.9–15.9)
GLUCOSE SERPL-MCNC: 87 MG/DL (ref 74–106)
HCT VFR BLD CALC: 46 % (ref 35.4–49)
HGB BLD-MCNC: 14.9 GM/DL (ref 11.7–16.9)
HIV 1+2 AB+HIV1 P24 AG SERPL QL IA: NEGATIVE
MCH RBC QN AUTO: 29.1 PG (ref 25.7–33.7)
MCHC RBC AUTO-ENTMCNC: 32.5 G/DL (ref 32–35.9)
MCV RBC: 89.7 FL (ref 80–96)
PLATELET # BLD AUTO: 301 K/MM3 (ref 134–434)
PMV BLD: 8.4 FL (ref 7.5–11.1)
POTASSIUM SERPLBLD-SCNC: 3.8 MMOL/L (ref 3.5–5.1)
PROT SERPL-MCNC: 8.3 G/DL (ref 6.4–8.2)
RBC # BLD AUTO: 5.13 M/MM3 (ref 4–5.6)
SODIUM SERPL-SCNC: 138 MMOL/L (ref 136–145)
WBC # BLD AUTO: 13 K/MM3 (ref 4–10)

## 2020-11-11 PROCEDURE — HZ2ZZZZ DETOXIFICATION SERVICES FOR SUBSTANCE ABUSE TREATMENT: ICD-10-PCS | Performed by: ALLERGY & IMMUNOLOGY

## 2020-11-11 PROCEDURE — U0003 INFECTIOUS AGENT DETECTION BY NUCLEIC ACID (DNA OR RNA); SEVERE ACUTE RESPIRATORY SYNDROME CORONAVIRUS 2 (SARS-COV-2) (CORONAVIRUS DISEASE [COVID-19]), AMPLIFIED PROBE TECHNIQUE, MAKING USE OF HIGH THROUGHPUT TECHNOLOGIES AS DESCRIBED BY CMS-2020-01-R: HCPCS

## 2020-11-11 PROCEDURE — C9803 HOPD COVID-19 SPEC COLLECT: HCPCS

## 2020-11-11 RX ADMIN — HYDROXYZINE PAMOATE SCH MG: 25 CAPSULE ORAL at 22:13

## 2020-11-11 RX ADMIN — LISINOPRIL SCH MG: 20 TABLET ORAL at 14:03

## 2020-11-11 RX ADMIN — ASPIRIN SCH MG: 81 TABLET, COATED ORAL at 14:03

## 2020-11-11 RX ADMIN — HYDROXYZINE PAMOATE SCH MG: 25 CAPSULE ORAL at 17:09

## 2020-11-11 RX ADMIN — HYDROXYZINE PAMOATE SCH MG: 25 CAPSULE ORAL at 14:04

## 2020-11-11 RX ADMIN — Medication SCH MG: at 22:11

## 2020-11-12 RX ADMIN — Medication SCH MG: at 22:22

## 2020-11-12 RX ADMIN — ESCITALOPRAM SCH MG: 20 TABLET, FILM COATED ORAL at 12:48

## 2020-11-12 RX ADMIN — Medication SCH TAB: at 11:33

## 2020-11-12 RX ADMIN — HYDROXYZINE PAMOATE SCH MG: 25 CAPSULE ORAL at 06:05

## 2020-11-12 RX ADMIN — HYDROXYZINE PAMOATE SCH: 25 CAPSULE ORAL at 11:58

## 2020-11-12 RX ADMIN — LURASIDONE HYDROCHLORIDE SCH MG: 20 TABLET, FILM COATED ORAL at 22:24

## 2020-11-12 RX ADMIN — ASPIRIN SCH MG: 81 TABLET, COATED ORAL at 11:34

## 2020-11-12 RX ADMIN — LISINOPRIL SCH MG: 20 TABLET ORAL at 11:34

## 2020-11-12 RX ADMIN — PANTOPRAZOLE SODIUM SCH MG: 40 TABLET, DELAYED RELEASE ORAL at 11:35

## 2020-11-12 RX ADMIN — HYDROXYZINE PAMOATE PRN MG: 25 CAPSULE ORAL at 11:34

## 2020-11-12 RX ADMIN — PRAZOSIN HYDROCHLORIDE SCH MG: 1 CAPSULE ORAL at 22:25

## 2020-11-12 RX ADMIN — NICOTINE SCH MG: 7 PATCH TRANSDERMAL at 11:33

## 2020-11-13 LAB
ALBUMIN SERPL-MCNC: 3.9 G/DL (ref 3.4–5)
ALP SERPL-CCNC: 52 U/L (ref 45–117)
ALT SERPL-CCNC: 32 U/L (ref 13–61)
ANION GAP SERPL CALC-SCNC: 6 MMOL/L (ref 8–16)
AST SERPL-CCNC: 24 U/L (ref 15–37)
BILIRUB SERPL-MCNC: 0.5 MG/DL (ref 0.2–1)
BUN SERPL-MCNC: 12.2 MG/DL (ref 7–18)
CALCIUM SERPL-MCNC: 9.7 MG/DL (ref 8.5–10.1)
CHLORIDE SERPL-SCNC: 108 MMOL/L (ref 98–107)
CO2 SERPL-SCNC: 26 MMOL/L (ref 21–32)
CREAT SERPL-MCNC: 1.4 MG/DL (ref 0.55–1.3)
GLUCOSE SERPL-MCNC: 109 MG/DL (ref 74–106)
POTASSIUM SERPLBLD-SCNC: 4.6 MMOL/L (ref 3.5–5.1)
PROT SERPL-MCNC: 7.8 G/DL (ref 6.4–8.2)
SODIUM SERPL-SCNC: 139 MMOL/L (ref 136–145)

## 2020-11-13 RX ADMIN — PRAZOSIN HYDROCHLORIDE SCH MG: 1 CAPSULE ORAL at 22:20

## 2020-11-13 RX ADMIN — ESCITALOPRAM SCH MG: 20 TABLET, FILM COATED ORAL at 11:20

## 2020-11-13 RX ADMIN — LISINOPRIL SCH MG: 20 TABLET ORAL at 11:20

## 2020-11-13 RX ADMIN — Medication SCH MG: at 22:20

## 2020-11-13 RX ADMIN — NICOTINE SCH: 7 PATCH TRANSDERMAL at 11:20

## 2020-11-13 RX ADMIN — Medication SCH TAB: at 11:20

## 2020-11-13 RX ADMIN — LURASIDONE HYDROCHLORIDE SCH MG: 20 TABLET, FILM COATED ORAL at 22:20

## 2020-11-13 RX ADMIN — PANTOPRAZOLE SODIUM SCH MG: 40 TABLET, DELAYED RELEASE ORAL at 11:21

## 2020-11-13 RX ADMIN — ASPIRIN SCH MG: 81 TABLET, COATED ORAL at 11:19

## 2020-11-14 LAB
BASOPHILS # BLD: 1.3 % (ref 0–2)
DEPRECATED RDW RBC AUTO: 14.6 % (ref 11.9–15.9)
EOSINOPHIL # BLD: 3.4 % (ref 0–4.5)
HCT VFR BLD CALC: 45.5 % (ref 35.4–49)
HGB BLD-MCNC: 15.1 GM/DL (ref 11.7–16.9)
LYMPHOCYTES # BLD: 24.9 % (ref 8–40)
MCH RBC QN AUTO: 29.7 PG (ref 25.7–33.7)
MCHC RBC AUTO-ENTMCNC: 33.2 G/DL (ref 32–35.9)
MCV RBC: 89.3 FL (ref 80–96)
MONOCYTES # BLD AUTO: 6.6 % (ref 3.8–10.2)
NEUTROPHILS # BLD: 63.8 % (ref 42.8–82.8)
PLATELET # BLD AUTO: 302 K/MM3 (ref 134–434)
PMV BLD: 8.5 FL (ref 7.5–11.1)
RBC # BLD AUTO: 5.09 M/MM3 (ref 4–5.6)
WBC # BLD AUTO: 6.6 K/MM3 (ref 4–10)

## 2020-11-14 RX ADMIN — ASPIRIN SCH MG: 81 TABLET, COATED ORAL at 10:21

## 2020-11-14 RX ADMIN — LISINOPRIL SCH MG: 20 TABLET ORAL at 10:21

## 2020-11-14 RX ADMIN — PANTOPRAZOLE SODIUM SCH MG: 40 TABLET, DELAYED RELEASE ORAL at 10:21

## 2020-11-14 RX ADMIN — NICOTINE SCH: 7 PATCH TRANSDERMAL at 10:22

## 2020-11-14 RX ADMIN — ESCITALOPRAM SCH MG: 20 TABLET, FILM COATED ORAL at 10:19

## 2020-11-14 RX ADMIN — Medication SCH MG: at 22:25

## 2020-11-14 RX ADMIN — Medication SCH TAB: at 10:19

## 2020-11-14 RX ADMIN — PRAZOSIN HYDROCHLORIDE SCH MG: 1 CAPSULE ORAL at 22:25

## 2020-11-14 RX ADMIN — LURASIDONE HYDROCHLORIDE SCH MG: 20 TABLET, FILM COATED ORAL at 22:26

## 2020-11-15 RX ADMIN — Medication SCH MG: at 22:26

## 2020-11-15 RX ADMIN — PRAZOSIN HYDROCHLORIDE SCH MG: 1 CAPSULE ORAL at 22:26

## 2020-11-15 RX ADMIN — NICOTINE SCH: 7 PATCH TRANSDERMAL at 10:09

## 2020-11-15 RX ADMIN — Medication SCH TAB: at 10:09

## 2020-11-15 RX ADMIN — LURASIDONE HYDROCHLORIDE SCH MG: 20 TABLET, FILM COATED ORAL at 22:26

## 2020-11-15 RX ADMIN — PANTOPRAZOLE SODIUM SCH MG: 40 TABLET, DELAYED RELEASE ORAL at 10:10

## 2020-11-15 RX ADMIN — LISINOPRIL SCH MG: 20 TABLET ORAL at 10:09

## 2020-11-15 RX ADMIN — ASPIRIN SCH MG: 81 TABLET, COATED ORAL at 10:09

## 2020-11-15 RX ADMIN — ESCITALOPRAM SCH MG: 20 TABLET, FILM COATED ORAL at 10:09

## 2020-11-16 ENCOUNTER — HOSPITAL ENCOUNTER (INPATIENT)
Dept: HOSPITAL 74 - YASAS | Age: 60
LOS: 8 days | Discharge: HOME | DRG: 895 | End: 2020-11-24
Attending: ALLERGY & IMMUNOLOGY | Admitting: ALLERGY & IMMUNOLOGY
Payer: COMMERCIAL

## 2020-11-16 VITALS — TEMPERATURE: 97.3 F

## 2020-11-16 VITALS — DIASTOLIC BLOOD PRESSURE: 85 MMHG | HEART RATE: 87 BPM | SYSTOLIC BLOOD PRESSURE: 135 MMHG

## 2020-11-16 DIAGNOSIS — F10.20: Primary | ICD-10-CM

## 2020-11-16 DIAGNOSIS — F43.10: ICD-10-CM

## 2020-11-16 DIAGNOSIS — I25.10: ICD-10-CM

## 2020-11-16 DIAGNOSIS — F32.9: ICD-10-CM

## 2020-11-16 DIAGNOSIS — E78.00: ICD-10-CM

## 2020-11-16 DIAGNOSIS — F25.9: ICD-10-CM

## 2020-11-16 DIAGNOSIS — F19.20: ICD-10-CM

## 2020-11-16 DIAGNOSIS — Z91.018: ICD-10-CM

## 2020-11-16 DIAGNOSIS — I10: ICD-10-CM

## 2020-11-16 DIAGNOSIS — F17.210: ICD-10-CM

## 2020-11-16 DIAGNOSIS — K21.9: ICD-10-CM

## 2020-11-16 DIAGNOSIS — J45.909: ICD-10-CM

## 2020-11-16 DIAGNOSIS — F14.20: ICD-10-CM

## 2020-11-16 DIAGNOSIS — Z62.810: ICD-10-CM

## 2020-11-16 DIAGNOSIS — F41.9: ICD-10-CM

## 2020-11-16 PROCEDURE — HZ42ZZZ GROUP COUNSELING FOR SUBSTANCE ABUSE TREATMENT, COGNITIVE-BEHAVIORAL: ICD-10-PCS | Performed by: ALLERGY & IMMUNOLOGY

## 2020-11-16 RX ADMIN — HYDROXYZINE PAMOATE PRN MG: 25 CAPSULE ORAL at 06:11

## 2020-11-16 RX ADMIN — LISINOPRIL SCH MG: 20 TABLET ORAL at 10:19

## 2020-11-16 RX ADMIN — ESCITALOPRAM SCH MG: 20 TABLET, FILM COATED ORAL at 10:20

## 2020-11-16 RX ADMIN — Medication SCH MG: at 21:29

## 2020-11-16 RX ADMIN — PRAZOSIN HYDROCHLORIDE SCH MG: 1 CAPSULE ORAL at 21:28

## 2020-11-16 RX ADMIN — ASPIRIN SCH MG: 81 TABLET, COATED ORAL at 10:19

## 2020-11-16 RX ADMIN — NICOTINE SCH: 7 PATCH TRANSDERMAL at 10:20

## 2020-11-16 RX ADMIN — PANTOPRAZOLE SODIUM SCH MG: 40 TABLET, DELAYED RELEASE ORAL at 10:20

## 2020-11-16 RX ADMIN — LURASIDONE HYDROCHLORIDE SCH MG: 20 TABLET, FILM COATED ORAL at 21:28

## 2020-11-16 RX ADMIN — Medication SCH TAB: at 10:19

## 2020-11-16 RX ADMIN — SUVOREXANT PRN MG: 10 TABLET, FILM COATED ORAL at 21:30

## 2020-11-17 RX ADMIN — LISINOPRIL SCH MG: 20 TABLET ORAL at 21:11

## 2020-11-17 RX ADMIN — LISINOPRIL SCH MG: 20 TABLET ORAL at 09:52

## 2020-11-17 RX ADMIN — Medication SCH TAB: at 09:51

## 2020-11-17 RX ADMIN — ASPIRIN SCH MG: 81 TABLET, COATED ORAL at 09:51

## 2020-11-17 RX ADMIN — PANTOPRAZOLE SODIUM SCH MG: 40 TABLET, DELAYED RELEASE ORAL at 09:52

## 2020-11-17 RX ADMIN — ESCITALOPRAM SCH MG: 20 TABLET, FILM COATED ORAL at 09:52

## 2020-11-17 RX ADMIN — LURASIDONE HYDROCHLORIDE SCH MG: 20 TABLET, FILM COATED ORAL at 21:12

## 2020-11-17 RX ADMIN — NICOTINE SCH MG: 21 PATCH TRANSDERMAL at 09:52

## 2020-11-17 RX ADMIN — PRAZOSIN HYDROCHLORIDE SCH MG: 1 CAPSULE ORAL at 21:10

## 2020-11-17 RX ADMIN — SUVOREXANT PRN MG: 10 TABLET, FILM COATED ORAL at 21:13

## 2020-11-17 RX ADMIN — Medication SCH MG: at 21:10

## 2020-11-18 RX ADMIN — LISINOPRIL SCH MG: 20 TABLET ORAL at 21:09

## 2020-11-18 RX ADMIN — Medication SCH TAB: at 10:14

## 2020-11-18 RX ADMIN — LURASIDONE HYDROCHLORIDE SCH MG: 20 TABLET, FILM COATED ORAL at 21:09

## 2020-11-18 RX ADMIN — Medication SCH MG: at 21:09

## 2020-11-18 RX ADMIN — ESCITALOPRAM SCH MG: 20 TABLET, FILM COATED ORAL at 10:14

## 2020-11-18 RX ADMIN — SUVOREXANT PRN MG: 15 TABLET, FILM COATED ORAL at 21:10

## 2020-11-18 RX ADMIN — NICOTINE SCH: 21 PATCH TRANSDERMAL at 10:14

## 2020-11-18 RX ADMIN — PRAZOSIN HYDROCHLORIDE SCH MG: 1 CAPSULE ORAL at 21:09

## 2020-11-18 RX ADMIN — BUDESONIDE AND FORMOTEROL FUMARATE DIHYDRATE SCH PUFF: 160; 4.5 AEROSOL RESPIRATORY (INHALATION) at 10:15

## 2020-11-18 RX ADMIN — LISINOPRIL SCH MG: 20 TABLET ORAL at 10:14

## 2020-11-18 RX ADMIN — PANTOPRAZOLE SODIUM SCH MG: 40 TABLET, DELAYED RELEASE ORAL at 10:14

## 2020-11-18 RX ADMIN — ASPIRIN SCH MG: 81 TABLET, COATED ORAL at 10:14

## 2020-11-18 RX ADMIN — BUDESONIDE AND FORMOTEROL FUMARATE DIHYDRATE SCH: 160; 4.5 AEROSOL RESPIRATORY (INHALATION) at 21:11

## 2020-11-19 RX ADMIN — LISINOPRIL SCH MG: 20 TABLET ORAL at 09:53

## 2020-11-19 RX ADMIN — BUDESONIDE AND FORMOTEROL FUMARATE DIHYDRATE SCH PUFF: 160; 4.5 AEROSOL RESPIRATORY (INHALATION) at 09:53

## 2020-11-19 RX ADMIN — NICOTINE SCH: 21 PATCH TRANSDERMAL at 09:53

## 2020-11-19 RX ADMIN — Medication SCH MG: at 21:15

## 2020-11-19 RX ADMIN — Medication SCH TAB: at 09:53

## 2020-11-19 RX ADMIN — PANTOPRAZOLE SODIUM SCH MG: 40 TABLET, DELAYED RELEASE ORAL at 09:53

## 2020-11-19 RX ADMIN — PRAZOSIN HYDROCHLORIDE SCH MG: 1 CAPSULE ORAL at 21:15

## 2020-11-19 RX ADMIN — LURASIDONE HYDROCHLORIDE SCH MG: 20 TABLET, FILM COATED ORAL at 21:15

## 2020-11-19 RX ADMIN — ESCITALOPRAM SCH MG: 20 TABLET, FILM COATED ORAL at 09:53

## 2020-11-19 RX ADMIN — BUDESONIDE AND FORMOTEROL FUMARATE DIHYDRATE SCH: 160; 4.5 AEROSOL RESPIRATORY (INHALATION) at 21:17

## 2020-11-19 RX ADMIN — LISINOPRIL SCH MG: 20 TABLET ORAL at 21:15

## 2020-11-19 RX ADMIN — HYDROXYZINE PAMOATE PRN MG: 25 CAPSULE ORAL at 09:54

## 2020-11-19 RX ADMIN — ASPIRIN SCH MG: 81 TABLET, COATED ORAL at 09:53

## 2020-11-19 RX ADMIN — HYDROXYZINE PAMOATE PRN MG: 25 CAPSULE ORAL at 21:16

## 2020-11-19 RX ADMIN — SUVOREXANT PRN MG: 15 TABLET, FILM COATED ORAL at 21:16

## 2020-11-20 RX ADMIN — ASPIRIN SCH MG: 81 TABLET, COATED ORAL at 09:38

## 2020-11-20 RX ADMIN — BUDESONIDE AND FORMOTEROL FUMARATE DIHYDRATE SCH PUFF: 160; 4.5 AEROSOL RESPIRATORY (INHALATION) at 09:38

## 2020-11-20 RX ADMIN — LURASIDONE HYDROCHLORIDE SCH MG: 20 TABLET, FILM COATED ORAL at 22:28

## 2020-11-20 RX ADMIN — LISINOPRIL SCH MG: 20 TABLET ORAL at 09:38

## 2020-11-20 RX ADMIN — HYDROXYZINE PAMOATE PRN MG: 25 CAPSULE ORAL at 15:47

## 2020-11-20 RX ADMIN — Medication SCH MG: at 21:52

## 2020-11-20 RX ADMIN — BUDESONIDE AND FORMOTEROL FUMARATE DIHYDRATE SCH PUFF: 160; 4.5 AEROSOL RESPIRATORY (INHALATION) at 22:28

## 2020-11-20 RX ADMIN — HYDROXYZINE PAMOATE PRN MG: 25 CAPSULE ORAL at 09:38

## 2020-11-20 RX ADMIN — PANTOPRAZOLE SODIUM SCH MG: 40 TABLET, DELAYED RELEASE ORAL at 09:38

## 2020-11-20 RX ADMIN — HYDROXYZINE PAMOATE PRN MG: 25 CAPSULE ORAL at 21:51

## 2020-11-20 RX ADMIN — NICOTINE SCH: 21 PATCH TRANSDERMAL at 09:39

## 2020-11-20 RX ADMIN — Medication SCH TAB: at 09:38

## 2020-11-20 RX ADMIN — LISINOPRIL SCH MG: 20 TABLET ORAL at 21:51

## 2020-11-20 RX ADMIN — ESCITALOPRAM SCH MG: 20 TABLET, FILM COATED ORAL at 09:38

## 2020-11-20 RX ADMIN — PRAZOSIN HYDROCHLORIDE SCH MG: 1 CAPSULE ORAL at 21:51

## 2020-11-21 RX ADMIN — SUVOREXANT PRN MG: 5 TABLET, FILM COATED ORAL at 22:18

## 2020-11-21 RX ADMIN — ASPIRIN SCH MG: 81 TABLET, COATED ORAL at 10:14

## 2020-11-21 RX ADMIN — Medication SCH TAB: at 10:14

## 2020-11-21 RX ADMIN — LISINOPRIL SCH MG: 20 TABLET ORAL at 10:14

## 2020-11-21 RX ADMIN — LISINOPRIL SCH MG: 20 TABLET ORAL at 22:17

## 2020-11-21 RX ADMIN — LURASIDONE HYDROCHLORIDE SCH MG: 20 TABLET, FILM COATED ORAL at 22:16

## 2020-11-21 RX ADMIN — BUDESONIDE AND FORMOTEROL FUMARATE DIHYDRATE SCH PUFF: 160; 4.5 AEROSOL RESPIRATORY (INHALATION) at 10:14

## 2020-11-21 RX ADMIN — HYDROXYZINE PAMOATE PRN MG: 25 CAPSULE ORAL at 10:14

## 2020-11-21 RX ADMIN — BUDESONIDE AND FORMOTEROL FUMARATE DIHYDRATE SCH PUFF: 160; 4.5 AEROSOL RESPIRATORY (INHALATION) at 22:19

## 2020-11-21 RX ADMIN — Medication SCH MG: at 22:17

## 2020-11-21 RX ADMIN — NICOTINE SCH: 21 PATCH TRANSDERMAL at 10:14

## 2020-11-21 RX ADMIN — ESCITALOPRAM SCH MG: 20 TABLET, FILM COATED ORAL at 10:14

## 2020-11-21 RX ADMIN — PRAZOSIN HYDROCHLORIDE SCH MG: 1 CAPSULE ORAL at 22:15

## 2020-11-21 RX ADMIN — PANTOPRAZOLE SODIUM SCH MG: 40 TABLET, DELAYED RELEASE ORAL at 10:14

## 2020-11-22 RX ADMIN — ASPIRIN SCH MG: 81 TABLET, COATED ORAL at 10:03

## 2020-11-22 RX ADMIN — ESCITALOPRAM SCH MG: 20 TABLET, FILM COATED ORAL at 10:03

## 2020-11-22 RX ADMIN — BUDESONIDE AND FORMOTEROL FUMARATE DIHYDRATE SCH PUFF: 160; 4.5 AEROSOL RESPIRATORY (INHALATION) at 10:03

## 2020-11-22 RX ADMIN — Medication SCH MG: at 21:54

## 2020-11-22 RX ADMIN — LISINOPRIL SCH MG: 20 TABLET ORAL at 21:54

## 2020-11-22 RX ADMIN — Medication SCH TAB: at 10:03

## 2020-11-22 RX ADMIN — PRAZOSIN HYDROCHLORIDE SCH MG: 1 CAPSULE ORAL at 21:54

## 2020-11-22 RX ADMIN — SUVOREXANT PRN MG: 5 TABLET, FILM COATED ORAL at 21:55

## 2020-11-22 RX ADMIN — NICOTINE SCH: 21 PATCH TRANSDERMAL at 10:03

## 2020-11-22 RX ADMIN — LURASIDONE HYDROCHLORIDE SCH MG: 20 TABLET, FILM COATED ORAL at 21:56

## 2020-11-22 RX ADMIN — BUDESONIDE AND FORMOTEROL FUMARATE DIHYDRATE SCH: 160; 4.5 AEROSOL RESPIRATORY (INHALATION) at 22:43

## 2020-11-22 RX ADMIN — LISINOPRIL SCH MG: 20 TABLET ORAL at 10:03

## 2020-11-22 RX ADMIN — HYDROXYZINE PAMOATE PRN MG: 25 CAPSULE ORAL at 10:03

## 2020-11-22 RX ADMIN — PANTOPRAZOLE SODIUM SCH MG: 40 TABLET, DELAYED RELEASE ORAL at 10:03

## 2020-11-23 RX ADMIN — LISINOPRIL SCH MG: 20 TABLET ORAL at 21:23

## 2020-11-23 RX ADMIN — ESCITALOPRAM SCH MG: 20 TABLET, FILM COATED ORAL at 10:29

## 2020-11-23 RX ADMIN — LURASIDONE HYDROCHLORIDE SCH MG: 20 TABLET, FILM COATED ORAL at 21:24

## 2020-11-23 RX ADMIN — PANTOPRAZOLE SODIUM SCH MG: 40 TABLET, DELAYED RELEASE ORAL at 10:29

## 2020-11-23 RX ADMIN — Medication SCH TAB: at 10:29

## 2020-11-23 RX ADMIN — HYDROXYZINE PAMOATE PRN MG: 25 CAPSULE ORAL at 10:29

## 2020-11-23 RX ADMIN — LISINOPRIL SCH MG: 20 TABLET ORAL at 11:32

## 2020-11-23 RX ADMIN — BUDESONIDE AND FORMOTEROL FUMARATE DIHYDRATE SCH PUFF: 160; 4.5 AEROSOL RESPIRATORY (INHALATION) at 10:29

## 2020-11-23 RX ADMIN — BUDESONIDE AND FORMOTEROL FUMARATE DIHYDRATE SCH PUFF: 160; 4.5 AEROSOL RESPIRATORY (INHALATION) at 21:24

## 2020-11-23 RX ADMIN — PRAZOSIN HYDROCHLORIDE SCH MG: 1 CAPSULE ORAL at 21:22

## 2020-11-23 RX ADMIN — NICOTINE SCH: 21 PATCH TRANSDERMAL at 10:29

## 2020-11-23 RX ADMIN — SUVOREXANT PRN MG: 5 TABLET, FILM COATED ORAL at 21:25

## 2020-11-23 RX ADMIN — Medication SCH MG: at 21:23

## 2020-11-23 RX ADMIN — ASPIRIN SCH MG: 81 TABLET, COATED ORAL at 10:29

## 2020-11-24 VITALS — HEART RATE: 93 BPM | SYSTOLIC BLOOD PRESSURE: 154 MMHG | DIASTOLIC BLOOD PRESSURE: 85 MMHG

## 2020-11-24 VITALS — TEMPERATURE: 97.8 F

## 2020-11-24 RX ADMIN — ASPIRIN SCH MG: 81 TABLET, COATED ORAL at 10:41

## 2020-11-24 RX ADMIN — HYDROXYZINE PAMOATE PRN MG: 25 CAPSULE ORAL at 10:42

## 2020-11-24 RX ADMIN — PANTOPRAZOLE SODIUM SCH MG: 40 TABLET, DELAYED RELEASE ORAL at 10:41

## 2020-11-24 RX ADMIN — NICOTINE SCH: 21 PATCH TRANSDERMAL at 10:42

## 2020-11-24 RX ADMIN — Medication SCH TAB: at 10:41

## 2020-11-24 RX ADMIN — LISINOPRIL SCH MG: 20 TABLET ORAL at 10:41

## 2020-11-24 RX ADMIN — ESCITALOPRAM SCH MG: 20 TABLET, FILM COATED ORAL at 10:41

## 2020-11-24 RX ADMIN — BUDESONIDE AND FORMOTEROL FUMARATE DIHYDRATE SCH PUFF: 160; 4.5 AEROSOL RESPIRATORY (INHALATION) at 10:42

## 2023-03-03 PROBLEM — Z00.00 ENCOUNTER FOR PREVENTIVE HEALTH EXAMINATION: Status: ACTIVE | Noted: 2023-03-03

## 2023-03-06 ENCOUNTER — APPOINTMENT (OUTPATIENT)
Dept: OTOLARYNGOLOGY | Facility: CLINIC | Age: 63
End: 2023-03-06